# Patient Record
Sex: FEMALE | Race: WHITE | NOT HISPANIC OR LATINO | Employment: FULL TIME | URBAN - METROPOLITAN AREA
[De-identification: names, ages, dates, MRNs, and addresses within clinical notes are randomized per-mention and may not be internally consistent; named-entity substitution may affect disease eponyms.]

---

## 2023-09-09 PROBLEM — N92.6 IRREGULAR BLEEDING: Status: ACTIVE | Noted: 2023-09-09

## 2023-09-09 PROBLEM — E78.5 MILD HYPERLIPIDEMIA: Status: ACTIVE | Noted: 2023-09-09

## 2023-09-09 PROBLEM — R06.81 WITNESSED EPISODE OF APNEA: Status: ACTIVE | Noted: 2023-09-09

## 2023-09-09 PROBLEM — N95.1 PERIMENOPAUSE: Status: ACTIVE | Noted: 2023-09-09

## 2023-09-09 PROBLEM — G43.909 MIGRAINE: Status: ACTIVE | Noted: 2023-09-09

## 2023-09-09 PROBLEM — E55.9 VITAMIN D DEFICIENCY: Status: ACTIVE | Noted: 2023-09-09

## 2023-09-09 PROBLEM — N95.0 POSTMENOPAUSAL BLEEDING: Status: ACTIVE | Noted: 2023-09-09

## 2023-09-09 PROBLEM — E66.01 MORBID OBESITY (MULTI): Status: ACTIVE | Noted: 2023-09-09

## 2023-09-09 PROBLEM — R06.83 SNORING: Status: ACTIVE | Noted: 2023-09-09

## 2023-09-09 PROBLEM — R53.83 FATIGUE: Status: ACTIVE | Noted: 2023-09-09

## 2023-09-09 RX ORDER — OMEGA-3 FATTY ACIDS 1000 MG
1 CAPSULE ORAL DAILY
COMMUNITY

## 2023-09-09 RX ORDER — ERGOCALCIFEROL 1.25 MG/1
1.25 CAPSULE ORAL
COMMUNITY
Start: 2022-02-02 | End: 2024-03-21 | Stop reason: ALTCHOICE

## 2023-09-09 RX ORDER — CHOLECALCIFEROL (VITAMIN D3) 50 MCG
50 TABLET ORAL DAILY
COMMUNITY

## 2023-09-09 RX ORDER — OMEPRAZOLE 40 MG/1
40 CAPSULE, DELAYED RELEASE ORAL
COMMUNITY
Start: 2023-02-06 | End: 2024-03-21 | Stop reason: ALTCHOICE

## 2024-03-20 ASSESSMENT — ENCOUNTER SYMPTOMS
JOINT SWELLING: 0
UNEXPECTED WEIGHT CHANGE: 0
WEAKNESS: 0
DIFFICULTY URINATING: 0
DIZZINESS: 0
ADENOPATHY: 0
SHORTNESS OF BREATH: 0
DYSURIA: 0
HEADACHES: 0
ABDOMINAL DISTENTION: 0
FATIGUE: 0
ABDOMINAL PAIN: 0
ACTIVITY CHANGE: 0
COLOR CHANGE: 0
CHEST TIGHTNESS: 0

## 2024-03-21 ENCOUNTER — OFFICE VISIT (OUTPATIENT)
Dept: OBSTETRICS AND GYNECOLOGY | Facility: CLINIC | Age: 59
End: 2024-03-21
Payer: COMMERCIAL

## 2024-03-21 VITALS
HEIGHT: 67 IN | BODY MASS INDEX: 31.27 KG/M2 | SYSTOLIC BLOOD PRESSURE: 120 MMHG | DIASTOLIC BLOOD PRESSURE: 80 MMHG | WEIGHT: 199.2 LBS

## 2024-03-21 DIAGNOSIS — Z78.0 MENOPAUSE: ICD-10-CM

## 2024-03-21 DIAGNOSIS — Z12.31 ENCOUNTER FOR SCREENING MAMMOGRAM FOR MALIGNANT NEOPLASM OF BREAST: ICD-10-CM

## 2024-03-21 DIAGNOSIS — Z12.11 SCREEN FOR COLON CANCER: ICD-10-CM

## 2024-03-21 DIAGNOSIS — Z01.419 VISIT FOR GYNECOLOGIC EXAMINATION: Primary | ICD-10-CM

## 2024-03-21 PROCEDURE — 99396 PREV VISIT EST AGE 40-64: CPT | Performed by: OBSTETRICS & GYNECOLOGY

## 2024-03-21 PROCEDURE — 1036F TOBACCO NON-USER: CPT | Performed by: OBSTETRICS & GYNECOLOGY

## 2024-03-21 PROCEDURE — 3008F BODY MASS INDEX DOCD: CPT | Performed by: OBSTETRICS & GYNECOLOGY

## 2024-03-21 RX ORDER — IBUPROFEN 100 MG/5ML
1000 SUSPENSION, ORAL (FINAL DOSE FORM) ORAL DAILY
COMMUNITY

## 2024-03-21 ASSESSMENT — PAIN SCALES - GENERAL: PAINLEVEL: 0-NO PAIN

## 2024-03-21 ASSESSMENT — ENCOUNTER SYMPTOMS
OCCASIONAL FEELINGS OF UNSTEADINESS: 0
DEPRESSION: 0
LOSS OF SENSATION IN FEET: 0

## 2024-03-21 ASSESSMENT — LIFESTYLE VARIABLES
HOW MANY STANDARD DRINKS CONTAINING ALCOHOL DO YOU HAVE ON A TYPICAL DAY: 1 OR 2
HOW OFTEN DO YOU HAVE SIX OR MORE DRINKS ON ONE OCCASION: NEVER
SKIP TO QUESTIONS 9-10: 1
AUDIT-C TOTAL SCORE: 3
HOW OFTEN DO YOU HAVE A DRINK CONTAINING ALCOHOL: 2-3 TIMES A WEEK

## 2024-03-21 ASSESSMENT — PATIENT HEALTH QUESTIONNAIRE - PHQ9
1. LITTLE INTEREST OR PLEASURE IN DOING THINGS: NOT AT ALL
SUM OF ALL RESPONSES TO PHQ9 QUESTIONS 1 & 2: 0
2. FEELING DOWN, DEPRESSED OR HOPELESS: NOT AT ALL

## 2024-03-21 NOTE — PROGRESS NOTES
"Annual-menopause  Subjective   Mayuri La is a 58 y.o. female who is here for a routine exam.   Complaints: reports some wt regain over the winter; recent uri; due for wellness exam w pcp;     denies vag bleed or discharge; denies pelvic  pain, pressure, or persistent bloating.  PMHx:  Eye Exam-        Dental Exam-        PAP- .       Mamm-        Colonoscopy- No ; Cologuard completed, 2023; f/u due.       Bilat brst complex cysts; resolved on  mamm  Surgical History: None  Father: , MVA; Parkinson's  Mother: ,  Alzheimer's; ASTHMA  Occupation: INTERNET INVESTIGATOR.  Last pap  2022-neg,hpv-neg  Mamm  2023 dx studies neg/resume routine; 10/13/2022-stable, 2021= stable and decreasing complex cysts in each breast; advised follow-up 6 months  Pelvic Ultrasound 2020 11 mm endometrium with cystic component.   Menarche 11. Date of Last Period 2020.   STDs none. HPV NEGATIVE . gardasil no.   currently sexually active- no issue w pain/dryness; denies exposure concerns.   Other endometrial bx 2018 weakly proliferative tissue.   Total pregnancies  4. Total live births  4.    Review of Systems   Constitutional:  Negative for activity change, fatigue and unexpected weight change.   Respiratory:  Negative for chest tightness and shortness of breath.    Cardiovascular:  Negative for chest pain and leg swelling.   Gastrointestinal:  Negative for abdominal distention and abdominal pain.   Genitourinary:  Negative for difficulty urinating, dysuria, genital sores, pelvic pain, vaginal bleeding, vaginal discharge and vaginal pain.   Musculoskeletal:  Negative for gait problem and joint swelling.   Skin:  Negative for color change and rash.   Neurological:  Negative for dizziness, weakness and headaches.   Hematological:  Negative for adenopathy.   Objective Visit Vitals  /80   Ht 1.702 m (5' 7\")   Wt 90.4 kg (199 lb 3.2 oz)   BMI 31.20 kg/m²   OB Status " Postmenopausal   Smoking Status Never   BSA 2.07 m²       General:   Alert and oriented, in no acute distress   Neck: Supple. No visible thyromegaly.    Breast/Axilla: Normal to palpation bilaterally without masses, skin changes, or nipple discharge.    Abdomen: Soft, non-tender, without masses or organomegaly   Vulva: Normal architecture without erythema, masses, or lesions.    Vagina: Normal mucosa without lesions, masses.   No abnormal vaginal discharge.    Cervix: Normal without masses, lesions, or signs of cervicitis.    Uterus: Normal mobile, non-enlarged uterus    Adnexa: No palpable masses or tenderness   Pelvic Floor No POP noted. No high tone pelvic floor    Psych  Rectal Normal affect. Normal mood.      Assessment/Plan   Encounter Diagnoses   Name Primary?    Visit for gynecologic examination; grossly bnl breast/gyn exams. Yes    Menopause; rare sxs; sleeps well, maybe occasional night sweat.     Encounter for screening mammogram for malignant neoplasm of breast; order placed     Screen for colon cancer; testing utd/neg     BMI 31.0-31.9,adult; ongoing wt loss efforts encouraged for multiple health benefits.     Karen Grande MD

## 2024-09-09 ENCOUNTER — TELEPHONE (OUTPATIENT)
Dept: PRIMARY CARE | Facility: CLINIC | Age: 59
End: 2024-09-09
Payer: COMMERCIAL

## 2024-09-10 ENCOUNTER — TELEMEDICINE (OUTPATIENT)
Dept: PRIMARY CARE | Facility: CLINIC | Age: 59
End: 2024-09-10
Payer: COMMERCIAL

## 2024-09-10 DIAGNOSIS — R10.84 GENERALIZED ABDOMINAL PAIN: ICD-10-CM

## 2024-09-10 DIAGNOSIS — R11.2 NAUSEA AND VOMITING, UNSPECIFIED VOMITING TYPE: ICD-10-CM

## 2024-09-10 DIAGNOSIS — R10.13 EPIGASTRIC PAIN: Primary | ICD-10-CM

## 2024-09-10 PROCEDURE — 1036F TOBACCO NON-USER: CPT | Performed by: PHYSICIAN ASSISTANT

## 2024-09-10 PROCEDURE — 99214 OFFICE O/P EST MOD 30 MIN: CPT | Performed by: PHYSICIAN ASSISTANT

## 2024-09-10 ASSESSMENT — ENCOUNTER SYMPTOMS
ABDOMINAL PAIN: 1
ABDOMINAL DISTENTION: 0
ALLERGIC/IMMUNOLOGIC NEGATIVE: 1
HEMATOLOGIC/LYMPHATIC NEGATIVE: 1
DIARRHEA: 0
MUSCULOSKELETAL NEGATIVE: 1
NAUSEA: 1
DIZZINESS: 0
BACK PAIN: 0
PSYCHIATRIC NEGATIVE: 1
COUGH: 0
NEUROLOGICAL NEGATIVE: 1
RECTAL PAIN: 0
COLOR CHANGE: 0
CONSTIPATION: 0
ANAL BLEEDING: 0
RESPIRATORY NEGATIVE: 1
SHORTNESS OF BREATH: 0
NERVOUS/ANXIOUS: 0
ARTHRALGIAS: 0
PALPITATIONS: 0
ENDOCRINE NEGATIVE: 1
HEADACHES: 0
VOMITING: 1
WHEEZING: 0
CONSTITUTIONAL NEGATIVE: 1
BLOOD IN STOOL: 0
EYES NEGATIVE: 1

## 2024-09-10 NOTE — PROGRESS NOTES
Subjective   Patient ID: Mayuri La is a 58 y.o. female who presents for Abdominal Pain.  An interactive audio and video telecommunication system which permits real time communications between the patient (at the originating site) and provider (at the distant site) was utilized to provide this telehealth service.    Verbal consent was requested and obtained from Mayuri La  on this date, 09/10/24  , for a telehealth visit.        HPI     MAYURI LA, 58 year old female with no significant medical hx is here today for recurrent episodes  of nausea with vominting and abdominal pain  Last episode monday 4am - 7am  9/21  sever abd - 10-10 pain with n/v   Then all sxs resolved   Normal urine and normal bowels --normal appetite and activity -   Then again on Sunday night - same sxs -- 730 pm till 2 am     Tries to take pepto and gas x --no relief with any OTC medications   No episodes since   Does not seem the be related to certain foods or activities or alcohol   Weight steady-- no loss   Had gained some over the past few months        Episode 1  1/2 year ago (2 2023) pt states she woke at 4 am with severe abdominal pain - diffuse - started in epigastric region   + 1 episode vomit at 7 am - + nausea - slight   took mylanta, tums and OTC prilosec   pain this am 10 -10 - not pain is 2-10   ?? hx gastric ulcer in the past 10-15 years ago      PMhx significant medical hx obesity, snoring, headaches, covid 19 -- 12/2021 and july 2022      patient states feeling well otherwise  denies fever, chills, headache, dizziness, CP, SOB, palpitations, edema, numbness, tingling, weakness  A chaperone was offered to the patient for the physical exam /  exam and was declined        Review of Systems   Constitutional: Negative.    HENT: Negative.     Eyes: Negative.    Respiratory: Negative.  Negative for cough, shortness of breath and wheezing.    Cardiovascular:  Negative for chest pain and palpitations.   Gastrointestinal:   Positive for abdominal pain, nausea and vomiting. Negative for abdominal distention, anal bleeding, blood in stool, constipation, diarrhea and rectal pain.   Endocrine: Negative.    Genitourinary: Negative.    Musculoskeletal: Negative.  Negative for arthralgias and back pain.   Skin: Negative.  Negative for color change, pallor and rash.   Allergic/Immunologic: Negative.    Neurological: Negative.  Negative for dizziness and headaches.   Hematological: Negative.    Psychiatric/Behavioral: Negative.  The patient is not nervous/anxious.        Objective   There were no vitals taken for this visit.    Physical Exam  Constitutional:       Appearance: Normal appearance.   Neurological:      Mental Status: She is alert and oriented to person, place, and time.   Psychiatric:         Mood and Affect: Mood normal.         Behavior: Behavior normal.         Thought Content: Thought content normal.         Judgment: Judgment normal.         Assessment/Plan   Problem List Items Addressed This Visit    None  Visit Diagnoses       Epigastric pain    -  Primary    Relevant Orders    CBC    Comprehensive Metabolic Panel    Lipase    Amylase    CT abdomen pelvis w and wo IV contrast    Referral to Gastroenterology    Nausea and vomiting, unspecified vomiting type        Relevant Orders    CBC    Comprehensive Metabolic Panel    Lipase    Amylase    CT abdomen pelvis w and wo IV contrast    Referral to Gastroenterology    Generalized abdominal pain        Relevant Orders    CBC    Comprehensive Metabolic Panel    Lipase    Amylase    CT abdomen pelvis w and wo IV contrast    Referral to Gastroenterology          abd pain - nausea - vomit   - Stable, improved on its own -- 100% resolved   -ER if sxs reoccur or worsen   - increase fluids and bland diet as well     use as needed Omeprazole 40 MG   â€“Discussed medication dosage, usage, goals of therapy, and side effects  -  refer to GI   CT abd and pelvis ordered today, pt advised to  call office when results are available to discuss with provider    Labs ordered today, pt advised to call office when results are available to discuss with provider         This visit was completed via [email or web address]   All issues as below were discussed and addressed  . If it was felt that the patient should be evaluated in clinic then they were directed there. The patient verbally consented to visit. Spent a total of 20 minutes with patient on Tele-Health discussing health concerns & more than 50% of this time was spent in counseling & coordination of care.

## 2024-09-11 ENCOUNTER — LAB (OUTPATIENT)
Dept: LAB | Facility: LAB | Age: 59
End: 2024-09-11
Payer: COMMERCIAL

## 2024-09-11 ENCOUNTER — OFFICE VISIT (OUTPATIENT)
Dept: GASTROENTEROLOGY | Facility: CLINIC | Age: 59
End: 2024-09-11
Payer: COMMERCIAL

## 2024-09-11 VITALS — HEART RATE: 85 BPM | OXYGEN SATURATION: 98 % | HEIGHT: 67 IN | BODY MASS INDEX: 32.49 KG/M2 | WEIGHT: 207 LBS

## 2024-09-11 DIAGNOSIS — R10.84 GENERALIZED ABDOMINAL PAIN: ICD-10-CM

## 2024-09-11 DIAGNOSIS — R10.13 EPIGASTRIC PAIN: ICD-10-CM

## 2024-09-11 DIAGNOSIS — R11.2 NAUSEA AND VOMITING, UNSPECIFIED VOMITING TYPE: ICD-10-CM

## 2024-09-11 LAB
ALBUMIN SERPL BCP-MCNC: 4.4 G/DL (ref 3.4–5)
ALP SERPL-CCNC: 72 U/L (ref 33–110)
ALT SERPL W P-5'-P-CCNC: 16 U/L (ref 7–45)
AMYLASE SERPL-CCNC: 42 U/L (ref 29–103)
ANION GAP SERPL CALC-SCNC: 13 MMOL/L (ref 10–20)
AST SERPL W P-5'-P-CCNC: 13 U/L (ref 9–39)
BILIRUB SERPL-MCNC: 0.6 MG/DL (ref 0–1.2)
BUN SERPL-MCNC: 14 MG/DL (ref 6–23)
CALCIUM SERPL-MCNC: 9.9 MG/DL (ref 8.6–10.6)
CHLORIDE SERPL-SCNC: 102 MMOL/L (ref 98–107)
CO2 SERPL-SCNC: 29 MMOL/L (ref 21–32)
CREAT SERPL-MCNC: 0.8 MG/DL (ref 0.5–1.05)
EGFRCR SERPLBLD CKD-EPI 2021: 86 ML/MIN/1.73M*2
ERYTHROCYTE [DISTWIDTH] IN BLOOD BY AUTOMATED COUNT: 12.6 % (ref 11.5–14.5)
GLUCOSE SERPL-MCNC: 88 MG/DL (ref 74–99)
HCT VFR BLD AUTO: 43 % (ref 36–46)
HGB BLD-MCNC: 14.5 G/DL (ref 12–16)
LIPASE SERPL-CCNC: 39 U/L (ref 9–82)
MCH RBC QN AUTO: 31.1 PG (ref 26–34)
MCHC RBC AUTO-ENTMCNC: 33.7 G/DL (ref 32–36)
MCV RBC AUTO: 92 FL (ref 80–100)
NRBC BLD-RTO: 0 /100 WBCS (ref 0–0)
PLATELET # BLD AUTO: 273 X10*3/UL (ref 150–450)
POTASSIUM SERPL-SCNC: 4.4 MMOL/L (ref 3.5–5.3)
PROT SERPL-MCNC: 7.3 G/DL (ref 6.4–8.2)
RBC # BLD AUTO: 4.66 X10*6/UL (ref 4–5.2)
SODIUM SERPL-SCNC: 140 MMOL/L (ref 136–145)
WBC # BLD AUTO: 6.9 X10*3/UL (ref 4.4–11.3)

## 2024-09-11 PROCEDURE — 1036F TOBACCO NON-USER: CPT

## 2024-09-11 PROCEDURE — 82150 ASSAY OF AMYLASE: CPT

## 2024-09-11 PROCEDURE — 3008F BODY MASS INDEX DOCD: CPT

## 2024-09-11 PROCEDURE — 80053 COMPREHEN METABOLIC PANEL: CPT

## 2024-09-11 PROCEDURE — 85027 COMPLETE CBC AUTOMATED: CPT

## 2024-09-11 PROCEDURE — 83690 ASSAY OF LIPASE: CPT

## 2024-09-11 PROCEDURE — 99203 OFFICE O/P NEW LOW 30 MIN: CPT

## 2024-09-11 PROCEDURE — 36415 COLL VENOUS BLD VENIPUNCTURE: CPT

## 2024-09-11 RX ORDER — PANTOPRAZOLE SODIUM 40 MG/1
40 TABLET, DELAYED RELEASE ORAL DAILY
Qty: 30 TABLET | Refills: 11 | Status: SHIPPED | OUTPATIENT
Start: 2024-09-11 | End: 2025-09-11

## 2024-09-11 ASSESSMENT — ENCOUNTER SYMPTOMS
CHILLS: 0
RECTAL PAIN: 0
NAUSEA: 0
FEVER: 0
COUGH: 0
CONSTIPATION: 0
FATIGUE: 0
VOMITING: 1
APPETITE CHANGE: 0
BLOOD IN STOOL: 0
ABDOMINAL DISTENTION: 0
SHORTNESS OF BREATH: 0
ANAL BLEEDING: 0
TROUBLE SWALLOWING: 0
DIARRHEA: 0
ABDOMINAL PAIN: 1

## 2024-09-11 NOTE — ASSESSMENT & PLAN NOTE
Consider esophagitis, gastritis, duodenitis, PUD, possibly pancreatitis  -Antireflux regimen recommended  -Start 40 mg Protonix daily  -Patient would like to avoid EGD but is agreeable if absolutely necessary  -CT scan, CBC, CMP, amylase and lipase ordered per primary care    Follow-up in 2 months

## 2024-09-11 NOTE — PROGRESS NOTES
Subjective     History of Present Illness:   Mayuri La is a 58 y.o. female with PMHx of HLD and obesity who presents to GI clinic for further evaluation of epigastric pain, nausea and vomiting    Today, since early 2023 has had a few episodes of bad stomach pain, recently worsening over the past few weeks.  Last a few hours. Thinks she had an ulcer in the past, but never had EGD.  Pain is in epigastric region that is tight and feels hard. Vomits nonbloody emesis once during the episodes, but no nausea.  Denies NSAID use.  Eats once daily normally for the past 2 years/intermittent fasting.  Drinks a lot of coffee. Stress level has increased. Moving bowels daily with formed stools.  States she is terrified of having anesthesia for she has never gotten a colonoscopy.  Would like to avoid any procedures.  Denies constipation, diarrhea, dyspepsia, melena, hematochezia, dysphagia, unintentional weight loss  CT scan is ordered from other provider.    Social  ETOH, denies smoking or marijuana  Denies fxh GI cancer or IBD  Abdominal Surgeries: denies    9/2023 negative Cologuard  Last colonoscopy   Last EGD       Past Medical History  As per HPI.     Social History  she  reports that she has never smoked. She has never used smokeless tobacco. She reports current alcohol use. She reports that she does not use drugs.     Family History  her family history includes Asthma in her mother; Breast cancer in her maternal grandmother; No Known Problems in her father.     Review of Systems  Review of Systems   Constitutional:  Negative for appetite change, chills, fatigue and fever.   HENT:  Negative for trouble swallowing.    Respiratory:  Negative for cough and shortness of breath.    Gastrointestinal:  Positive for abdominal pain (Intermittent epigastric) and vomiting. Negative for abdominal distention, anal bleeding, blood in stool, constipation, diarrhea, nausea and rectal pain.       Allergies  Allergies   Allergen  Reactions    Erythromycin Unknown    Penicillins Hives    Sulfa (Sulfonamide Antibiotics) Hives       Medications  Current Outpatient Medications   Medication Instructions    ascorbic acid (VITAMIN C) 1,000 mg, oral, Daily    cholecalciferol (VITAMIN D-3) 50 mcg, oral, Daily    omega-3 (Super Omega-3) 1,000 mg capsule capsule 1 capsule, oral, Daily    pantoprazole (PROTONIX) 40 mg, oral, Daily, Do not crush, chew, or split.        Objective   Visit Vitals  Pulse 85      Physical Exam  Constitutional:       Appearance: Normal appearance. She is normal weight.   HENT:      Mouth/Throat:      Mouth: Mucous membranes are dry.      Pharynx: Oropharynx is clear.   Cardiovascular:      Rate and Rhythm: Normal rate and regular rhythm.   Pulmonary:      Effort: Pulmonary effort is normal.      Breath sounds: Normal breath sounds. No wheezing or rhonchi.   Abdominal:      General: Abdomen is flat. Bowel sounds are normal. There is no distension.      Palpations: Abdomen is soft. There is no hepatomegaly.      Tenderness: There is no abdominal tenderness. There is no guarding or rebound. Negative signs include Romano's sign.      Hernia: No hernia is present.   Musculoskeletal:         General: Normal range of motion.   Skin:     General: Skin is warm and dry.   Neurological:      General: No focal deficit present.      Mental Status: She is alert and oriented to person, place, and time.   Psychiatric:         Mood and Affect: Mood normal.         Behavior: Behavior normal.           Lab Results   Component Value Date    WBC 6.8 01/26/2022    HGB 15.0 01/26/2022    HCT 45.5 01/26/2022     01/26/2022     Lab Results   Component Value Date     06/08/2022     01/26/2022    K 4.3 06/08/2022    K 4.4 01/26/2022     06/08/2022     01/26/2022    CO2 27 06/08/2022    CO2 26 01/26/2022    BUN 13 06/08/2022    BUN 18 01/26/2022    CREATININE 0.82 06/08/2022    CREATININE 0.75 01/26/2022    CALCIUM 10.0  06/08/2022    CALCIUM 9.8 01/26/2022    PROT 7.6 06/08/2022    PROT 7.4 01/26/2022    BILITOT 0.7 06/08/2022    BILITOT 0.6 01/26/2022    ALKPHOS 87 06/08/2022    ALKPHOS 79 01/26/2022    ALT 21 06/08/2022    ALT 29 01/26/2022    AST 18 06/08/2022    AST 18 01/26/2022    GLUCOSE 109 (H) 06/08/2022    GLUCOSE 115 (H) 01/26/2022           Mayuri La is a 58 y.o. female who presents to GI clinic for epigastric pain.    Epigastric pain  Consider esophagitis, gastritis, duodenitis, PUD, possibly pancreatitis  -Antireflux regimen recommended  -Start 40 mg Protonix daily  -Patient would like to avoid EGD but is agreeable if absolutely necessary  -CT scan, CBC, CMP, amylase and lipase ordered per primary care    Follow-up in 2 months         Mary Gallardo, APRN-CNP

## 2024-09-11 NOTE — PATIENT INSTRUCTIONS
Try to minimize acidic foods such as citrus fruits, tomatoes, and pop. Also try to avoid chocolate, peppermint, alcohol, and caffeine.  Avoid eating within 2-3 hours of lying down.   Eat more frequent smaller meals instead of a few large meals.  You can prop the head of your bed up when you are sleeping to decrease reflux.    Please take Pantoprazole 30-60 minutes before a meal daily.  This is to help calm stomach acid.    Follow up in 2-3 months.  Send me a message on my chart when you get your CT scan so I can review it

## 2024-09-12 NOTE — RESULT ENCOUNTER NOTE
Please call and inform pt that all labs are stable - bland  diet  And call office with any further questions or concerns           When is CT scan

## 2024-09-16 DIAGNOSIS — R10.13 EPIGASTRIC PAIN: ICD-10-CM

## 2024-09-16 DIAGNOSIS — R11.2 NAUSEA AND VOMITING, UNSPECIFIED VOMITING TYPE: ICD-10-CM

## 2024-09-18 ENCOUNTER — APPOINTMENT (OUTPATIENT)
Dept: PRIMARY CARE | Facility: CLINIC | Age: 59
End: 2024-09-18
Payer: COMMERCIAL

## 2024-09-19 ENCOUNTER — HOSPITAL ENCOUNTER (OUTPATIENT)
Dept: RADIOLOGY | Facility: HOSPITAL | Age: 59
Discharge: HOME | End: 2024-09-19
Payer: COMMERCIAL

## 2024-09-19 ENCOUNTER — APPOINTMENT (OUTPATIENT)
Dept: RADIOLOGY | Facility: HOSPITAL | Age: 59
End: 2024-09-19
Payer: COMMERCIAL

## 2024-09-19 DIAGNOSIS — R10.13 EPIGASTRIC PAIN: ICD-10-CM

## 2024-09-19 DIAGNOSIS — R11.2 NAUSEA AND VOMITING, UNSPECIFIED VOMITING TYPE: ICD-10-CM

## 2024-09-19 PROCEDURE — 74176 CT ABD & PELVIS W/O CONTRAST: CPT

## 2024-09-24 DIAGNOSIS — R11.2 NAUSEA AND VOMITING, UNSPECIFIED VOMITING TYPE: Primary | ICD-10-CM

## 2024-09-24 DIAGNOSIS — R10.84 GENERALIZED ABDOMINAL PAIN: ICD-10-CM

## 2024-09-27 ENCOUNTER — HOSPITAL ENCOUNTER (OUTPATIENT)
Dept: RADIOLOGY | Facility: HOSPITAL | Age: 59
Discharge: HOME | End: 2024-09-27
Payer: COMMERCIAL

## 2024-09-27 DIAGNOSIS — R11.2 NAUSEA AND VOMITING, UNSPECIFIED VOMITING TYPE: ICD-10-CM

## 2024-09-27 DIAGNOSIS — R10.84 GENERALIZED ABDOMINAL PAIN: ICD-10-CM

## 2024-09-27 PROCEDURE — 76705 ECHO EXAM OF ABDOMEN: CPT

## 2024-09-27 PROCEDURE — 76705 ECHO EXAM OF ABDOMEN: CPT | Performed by: RADIOLOGY

## 2024-09-30 DIAGNOSIS — R93.2 ABNORMAL ULTRASOUND OF GALLBLADDER: Primary | ICD-10-CM

## 2024-10-02 ENCOUNTER — HOSPITAL ENCOUNTER (OUTPATIENT)
Dept: RADIOLOGY | Facility: CLINIC | Age: 59
Discharge: HOME | End: 2024-10-02
Payer: COMMERCIAL

## 2024-10-02 VITALS — BODY MASS INDEX: 32.49 KG/M2 | WEIGHT: 207 LBS | HEIGHT: 67 IN

## 2024-10-02 DIAGNOSIS — Z12.31 ENCOUNTER FOR SCREENING MAMMOGRAM FOR MALIGNANT NEOPLASM OF BREAST: ICD-10-CM

## 2024-10-02 PROCEDURE — 77067 SCR MAMMO BI INCL CAD: CPT | Performed by: STUDENT IN AN ORGANIZED HEALTH CARE EDUCATION/TRAINING PROGRAM

## 2024-10-02 PROCEDURE — 77067 SCR MAMMO BI INCL CAD: CPT

## 2024-10-02 PROCEDURE — 77063 BREAST TOMOSYNTHESIS BI: CPT | Performed by: STUDENT IN AN ORGANIZED HEALTH CARE EDUCATION/TRAINING PROGRAM

## 2024-10-04 PROBLEM — M79.673 PAIN OF FOOT: Status: ACTIVE | Noted: 2024-10-04

## 2024-10-04 PROBLEM — Z91.89 OTHER SPECIFIED PERSONAL RISK FACTORS, NOT ELSEWHERE CLASSIFIED: Status: ACTIVE | Noted: 2024-10-04

## 2024-10-04 PROBLEM — R10.9 ACUTE ABDOMINAL PAIN: Status: ACTIVE | Noted: 2024-10-04

## 2024-10-04 PROBLEM — R11.2 NAUSEA AND VOMITING: Status: ACTIVE | Noted: 2024-10-04

## 2024-10-04 PROBLEM — E66.9 OBESITY WITH BODY MASS INDEX 30 OR GREATER: Status: ACTIVE | Noted: 2024-03-21

## 2024-10-04 PROBLEM — Z86.16 HISTORY OF SEVERE ACUTE RESPIRATORY SYNDROME CORONAVIRUS 2 (SARS-COV-2) DISEASE: Status: ACTIVE | Noted: 2024-10-04

## 2024-10-04 PROBLEM — R73.03 PREDIABETES: Status: ACTIVE | Noted: 2024-10-04

## 2024-10-04 PROBLEM — E66.9 OBESITY: Status: ACTIVE | Noted: 2023-09-09

## 2024-10-04 PROBLEM — Z80.3 FAMILY HISTORY OF MALIGNANT NEOPLASM OF BREAST: Status: ACTIVE | Noted: 2024-10-04

## 2024-10-04 PROBLEM — Z78.9 OTHER SPECIFIED CONDITIONS INFLUENCING HEALTH STATUS: Status: ACTIVE | Noted: 2024-10-04

## 2024-10-04 PROBLEM — R92.8 OTHER ABNORMAL AND INCONCLUSIVE FINDINGS ON DIAGNOSTIC IMAGING OF BREAST: Status: ACTIVE | Noted: 2023-04-13

## 2024-10-18 ENCOUNTER — OFFICE VISIT (OUTPATIENT)
Dept: SURGERY | Facility: CLINIC | Age: 59
End: 2024-10-18
Payer: COMMERCIAL

## 2024-10-18 VITALS
OXYGEN SATURATION: 99 % | BODY MASS INDEX: 32.65 KG/M2 | DIASTOLIC BLOOD PRESSURE: 90 MMHG | WEIGHT: 208 LBS | HEIGHT: 67 IN | HEART RATE: 102 BPM | SYSTOLIC BLOOD PRESSURE: 128 MMHG | TEMPERATURE: 98.3 F

## 2024-10-18 DIAGNOSIS — R93.2 ABNORMAL ULTRASOUND OF GALLBLADDER: ICD-10-CM

## 2024-10-18 DIAGNOSIS — K81.1 CHRONIC CHOLECYSTITIS: Primary | ICD-10-CM

## 2024-10-18 PROCEDURE — 99204 OFFICE O/P NEW MOD 45 MIN: CPT | Performed by: STUDENT IN AN ORGANIZED HEALTH CARE EDUCATION/TRAINING PROGRAM

## 2024-10-18 PROCEDURE — 99214 OFFICE O/P EST MOD 30 MIN: CPT | Performed by: STUDENT IN AN ORGANIZED HEALTH CARE EDUCATION/TRAINING PROGRAM

## 2024-10-18 PROCEDURE — 3008F BODY MASS INDEX DOCD: CPT | Performed by: STUDENT IN AN ORGANIZED HEALTH CARE EDUCATION/TRAINING PROGRAM

## 2024-10-18 ASSESSMENT — PATIENT HEALTH QUESTIONNAIRE - PHQ9
2. FEELING DOWN, DEPRESSED OR HOPELESS: NOT AT ALL
SUM OF ALL RESPONSES TO PHQ9 QUESTIONS 1 AND 2: 0
1. LITTLE INTEREST OR PLEASURE IN DOING THINGS: NOT AT ALL

## 2024-10-18 ASSESSMENT — PAIN SCALES - GENERAL: PAINLEVEL_OUTOF10: 0-NO PAIN

## 2024-10-21 NOTE — PROGRESS NOTES
History Of Present Illness  Mayuri La is a 59 y.o. female presenting for evaluation of gall stones. She has had several attacks of RUQ pain with associated n/v. Her PCP ordered a CT and RUQ US which showed gall stones including 1 in the neck.  She reports the last 1 was several hours long but eventually resolved on its own.  No pain in between episodes.     Past Medical History  She has no past medical history on file.    Surgical History  She has a past surgical history that includes Other surgical history (01/26/2022).     Social History  She reports that she has never smoked. She has never used smokeless tobacco. She reports current alcohol use of about 4.0 standard drinks of alcohol per week. She reports that she does not use drugs.    Family History  Family History   Problem Relation Name Age of Onset    Asthma Mother Yajaira Warm     No Known Problems Father      Breast cancer Maternal Grandmother Crista Cano 60        Allergies  Erythromycin, Penicillins, and Sulfa (sulfonamide antibiotics)    Review of Systems   Constitutional:  Negative for chills, fever and unexpected weight change.   HENT:  Negative for sneezing, sore throat, trouble swallowing and voice change.    Respiratory:  Negative for chest tightness and shortness of breath.    Cardiovascular:  Negative for chest pain and palpitations.   Gastrointestinal:  Positive for abdominal pain, nausea and vomiting. Negative for blood in stool and diarrhea.   Endocrine: Negative for cold intolerance and heat intolerance.   Genitourinary:  Negative for decreased urine volume, dysuria and hematuria.   Musculoskeletal:  Negative for arthralgias and gait problem.   Skin:  Negative for rash and wound.   Neurological:  Negative for facial asymmetry, speech difficulty and headaches.   Hematological:  Negative for adenopathy. Does not bruise/bleed easily.   Psychiatric/Behavioral:  Negative for self-injury and suicidal ideas.         Physical Exam  Vitals and  "nursing note reviewed.   Constitutional:       Appearance: Normal appearance.   HENT:      Head: Normocephalic and atraumatic.      Mouth/Throat:      Mouth: Mucous membranes are moist.      Pharynx: Oropharynx is clear.   Eyes:      Extraocular Movements: Extraocular movements intact.      Pupils: Pupils are equal, round, and reactive to light.   Cardiovascular:      Rate and Rhythm: Normal rate and regular rhythm.      Pulses: Normal pulses.   Pulmonary:      Effort: Pulmonary effort is normal.      Breath sounds: Normal breath sounds.   Abdominal:      General: There is no distension.      Palpations: Abdomen is soft.      Tenderness: There is no abdominal tenderness.   Musculoskeletal:      Cervical back: Normal range of motion and neck supple.   Skin:     General: Skin is warm and dry.   Neurological:      General: No focal deficit present.      Mental Status: She is alert and oriented to person, place, and time.   Psychiatric:         Mood and Affect: Mood normal.         Behavior: Behavior normal.          Last Recorded Vitals  Blood pressure 128/90, pulse 102, temperature 36.8 °C (98.3 °F), temperature source Oral, height 1.702 m (5' 7\"), weight 94.3 kg (208 lb), SpO2 99%.    Relevant Results  RUQ US with numerous stones including 1 at the neck of the gall bladder     Assessment/Plan   Problem List Items Addressed This Visit    None  Visit Diagnoses         Codes    Chronic cholecystitis    -  Primary K81.1    Relevant Orders    Case Request Operating Room: Cholecystectomy Robot-Assisted (Completed)    Abnormal ultrasound of gallbladder     R93.2          59-year-old female with multiple episodes of right upper quadrant pain.  Imaging has shown gallstones with a stone in the gallbladder neck.  We discussed the etiology of biliary pain and I think the stone in her gallbladder neck is the cause of her intermittent attacks.  I have recommended cholecystectomy.  I described the procedure in detail including " postoperative expectations in terms of pain control and wound care.  We discussed the risks, benefits and alternatives.  I described the possible side effect of diarrhea once the gallbladder is removed.  The patient understands and agrees that she needs her gallbladder out as this is likely the cause of her pain and she will continue to get intermittent attacks until she has surgery.  She does however have a lot of anxiety and fear about the anesthesia.  We discussed what to expect and the potential risks.  Her main concern is getting a panic attack while asleep since that has happened before and not being able to wake up.  Her brother is an anesthesiologist and she has talked to him about this as well.  She is not ready to schedule surgery today, and will call back when she is ready.  Encouraged her to call back sooner or come to the ER if she has an attack with pain that does not resolve, as this may be acute cholecystitis and she may require more urgent surgical intervention.      Aaliyah Fajardo MD

## 2024-10-23 ASSESSMENT — ENCOUNTER SYMPTOMS
CHEST TIGHTNESS: 0
WOUND: 0
DIARRHEA: 0
FEVER: 0
NAUSEA: 1
BLOOD IN STOOL: 0
CHILLS: 0
HEMATURIA: 0
VOICE CHANGE: 0
SPEECH DIFFICULTY: 0
UNEXPECTED WEIGHT CHANGE: 0
ADENOPATHY: 0
ABDOMINAL PAIN: 1
PALPITATIONS: 0
FACIAL ASYMMETRY: 0
BRUISES/BLEEDS EASILY: 0
SHORTNESS OF BREATH: 0
DYSURIA: 0
SORE THROAT: 0
HEADACHES: 0
VOMITING: 1
TROUBLE SWALLOWING: 0
ARTHRALGIAS: 0

## 2024-10-24 ENCOUNTER — TELEPHONE (OUTPATIENT)
Dept: PRIMARY CARE | Facility: CLINIC | Age: 59
End: 2024-10-24
Payer: COMMERCIAL

## 2024-10-24 ENCOUNTER — APPOINTMENT (OUTPATIENT)
Dept: RADIOLOGY | Facility: HOSPITAL | Age: 59
End: 2024-10-24
Payer: COMMERCIAL

## 2024-10-24 ENCOUNTER — HOSPITAL ENCOUNTER (EMERGENCY)
Facility: HOSPITAL | Age: 59
Discharge: HOME | End: 2024-10-24
Attending: EMERGENCY MEDICINE
Payer: COMMERCIAL

## 2024-10-24 VITALS
BODY MASS INDEX: 32.65 KG/M2 | SYSTOLIC BLOOD PRESSURE: 103 MMHG | HEART RATE: 98 BPM | WEIGHT: 208 LBS | HEIGHT: 67 IN | TEMPERATURE: 97.3 F | DIASTOLIC BLOOD PRESSURE: 61 MMHG | RESPIRATION RATE: 18 BRPM | OXYGEN SATURATION: 98 %

## 2024-10-24 DIAGNOSIS — R53.81 MALAISE AND FATIGUE: ICD-10-CM

## 2024-10-24 DIAGNOSIS — R53.83 MALAISE AND FATIGUE: ICD-10-CM

## 2024-10-24 DIAGNOSIS — R55 NEAR SYNCOPE: Primary | ICD-10-CM

## 2024-10-24 DIAGNOSIS — L50.9 URTICARIA: ICD-10-CM

## 2024-10-24 LAB
ALBUMIN SERPL BCP-MCNC: 3.8 G/DL (ref 3.4–5)
ALP SERPL-CCNC: 74 U/L (ref 33–110)
ALT SERPL W P-5'-P-CCNC: 17 U/L (ref 7–45)
ANION GAP SERPL CALCULATED.3IONS-SCNC: 12 MMOL/L (ref 10–20)
APPEARANCE UR: CLEAR
AST SERPL W P-5'-P-CCNC: 17 U/L (ref 9–39)
BACTERIA #/AREA URNS AUTO: ABNORMAL /HPF
BASOPHILS # BLD AUTO: 0.01 X10*3/UL (ref 0–0.1)
BASOPHILS NFR BLD AUTO: 0.1 %
BILIRUB SERPL-MCNC: 0.4 MG/DL (ref 0–1.2)
BILIRUB UR STRIP.AUTO-MCNC: NEGATIVE MG/DL
BNP SERPL-MCNC: 15 PG/ML (ref 0–99)
BUN SERPL-MCNC: 23 MG/DL (ref 6–23)
CALCIUM SERPL-MCNC: 9.2 MG/DL (ref 8.6–10.3)
CARDIAC TROPONIN I PNL SERPL HS: 3 NG/L (ref 0–13)
CARDIAC TROPONIN I PNL SERPL HS: 3 NG/L (ref 0–13)
CHLORIDE SERPL-SCNC: 106 MMOL/L (ref 98–107)
CO2 SERPL-SCNC: 25 MMOL/L (ref 21–32)
COLOR UR: YELLOW
CREAT SERPL-MCNC: 0.8 MG/DL (ref 0.5–1.05)
EGFRCR SERPLBLD CKD-EPI 2021: 85 ML/MIN/1.73M*2
EOSINOPHIL # BLD AUTO: 0.12 X10*3/UL (ref 0–0.7)
EOSINOPHIL NFR BLD AUTO: 1.2 %
ERYTHROCYTE [DISTWIDTH] IN BLOOD BY AUTOMATED COUNT: 12.8 % (ref 11.5–14.5)
FLUAV RNA RESP QL NAA+PROBE: NOT DETECTED
FLUBV RNA RESP QL NAA+PROBE: NOT DETECTED
GLUCOSE SERPL-MCNC: 178 MG/DL (ref 74–99)
GLUCOSE UR STRIP.AUTO-MCNC: ABNORMAL MG/DL
HCG UR QL IA.RAPID: NEGATIVE
HCT VFR BLD AUTO: 50.5 % (ref 36–46)
HGB BLD-MCNC: 17.3 G/DL (ref 12–16)
HOLD SPECIMEN: NORMAL
HYALINE CASTS #/AREA URNS AUTO: ABNORMAL /LPF
IMM GRANULOCYTES # BLD AUTO: 0.04 X10*3/UL (ref 0–0.7)
IMM GRANULOCYTES NFR BLD AUTO: 0.4 % (ref 0–0.9)
KETONES UR STRIP.AUTO-MCNC: NEGATIVE MG/DL
LEUKOCYTE ESTERASE UR QL STRIP.AUTO: NEGATIVE
LYMPHOCYTES # BLD AUTO: 2.84 X10*3/UL (ref 1.2–4.8)
LYMPHOCYTES NFR BLD AUTO: 27.5 %
MCH RBC QN AUTO: 31.7 PG (ref 26–34)
MCHC RBC AUTO-ENTMCNC: 34.3 G/DL (ref 32–36)
MCV RBC AUTO: 93 FL (ref 80–100)
MONOCYTES # BLD AUTO: 0.33 X10*3/UL (ref 0.1–1)
MONOCYTES NFR BLD AUTO: 3.2 %
MUCOUS THREADS #/AREA URNS AUTO: ABNORMAL /LPF
NEUTROPHILS # BLD AUTO: 6.97 X10*3/UL (ref 1.2–7.7)
NEUTROPHILS NFR BLD AUTO: 67.6 %
NITRITE UR QL STRIP.AUTO: NEGATIVE
NRBC BLD-RTO: 0 /100 WBCS (ref 0–0)
PH UR STRIP.AUTO: 5.5 [PH]
PLATELET # BLD AUTO: 235 X10*3/UL (ref 150–450)
POTASSIUM SERPL-SCNC: 4 MMOL/L (ref 3.5–5.3)
PROT SERPL-MCNC: 6.4 G/DL (ref 6.4–8.2)
PROT UR STRIP.AUTO-MCNC: ABNORMAL MG/DL
RBC # BLD AUTO: 5.45 X10*6/UL (ref 4–5.2)
RBC # UR STRIP.AUTO: NEGATIVE /UL
RBC #/AREA URNS AUTO: ABNORMAL /HPF
SARS-COV-2 RNA RESP QL NAA+PROBE: NOT DETECTED
SODIUM SERPL-SCNC: 139 MMOL/L (ref 136–145)
SP GR UR STRIP.AUTO: 1.03
SQUAMOUS #/AREA URNS AUTO: ABNORMAL /HPF
UROBILINOGEN UR STRIP.AUTO-MCNC: NORMAL MG/DL
WBC # BLD AUTO: 10.3 X10*3/UL (ref 4.4–11.3)
WBC #/AREA URNS AUTO: ABNORMAL /HPF

## 2024-10-24 PROCEDURE — 81025 URINE PREGNANCY TEST: CPT | Performed by: EMERGENCY MEDICINE

## 2024-10-24 PROCEDURE — 84484 ASSAY OF TROPONIN QUANT: CPT | Performed by: EMERGENCY MEDICINE

## 2024-10-24 PROCEDURE — 36415 COLL VENOUS BLD VENIPUNCTURE: CPT | Performed by: EMERGENCY MEDICINE

## 2024-10-24 PROCEDURE — 80053 COMPREHEN METABOLIC PANEL: CPT | Performed by: EMERGENCY MEDICINE

## 2024-10-24 PROCEDURE — 81001 URINALYSIS AUTO W/SCOPE: CPT | Performed by: EMERGENCY MEDICINE

## 2024-10-24 PROCEDURE — 96375 TX/PRO/DX INJ NEW DRUG ADDON: CPT

## 2024-10-24 PROCEDURE — 99284 EMERGENCY DEPT VISIT MOD MDM: CPT | Mod: 25

## 2024-10-24 PROCEDURE — 96374 THER/PROPH/DIAG INJ IV PUSH: CPT

## 2024-10-24 PROCEDURE — 71045 X-RAY EXAM CHEST 1 VIEW: CPT | Performed by: RADIOLOGY

## 2024-10-24 PROCEDURE — 87636 SARSCOV2 & INF A&B AMP PRB: CPT | Performed by: EMERGENCY MEDICINE

## 2024-10-24 PROCEDURE — 85025 COMPLETE CBC W/AUTO DIFF WBC: CPT | Performed by: EMERGENCY MEDICINE

## 2024-10-24 PROCEDURE — 83880 ASSAY OF NATRIURETIC PEPTIDE: CPT | Performed by: EMERGENCY MEDICINE

## 2024-10-24 PROCEDURE — 2500000004 HC RX 250 GENERAL PHARMACY W/ HCPCS (ALT 636 FOR OP/ED): Performed by: EMERGENCY MEDICINE

## 2024-10-24 PROCEDURE — 96361 HYDRATE IV INFUSION ADD-ON: CPT

## 2024-10-24 PROCEDURE — 71045 X-RAY EXAM CHEST 1 VIEW: CPT

## 2024-10-24 RX ORDER — METHYLPREDNISOLONE 4 MG/1
TABLET ORAL
Qty: 21 TABLET | Refills: 0 | Status: SHIPPED | OUTPATIENT
Start: 2024-10-24 | End: 2024-10-31

## 2024-10-24 RX ORDER — DIPHENHYDRAMINE HYDROCHLORIDE 50 MG/ML
50 INJECTION INTRAMUSCULAR; INTRAVENOUS ONCE
Status: COMPLETED | OUTPATIENT
Start: 2024-10-24 | End: 2024-10-24

## 2024-10-24 RX ORDER — DIPHENHYDRAMINE HCL 25 MG
25 TABLET ORAL EVERY 6 HOURS
Qty: 20 TABLET | Refills: 0 | Status: SHIPPED | OUTPATIENT
Start: 2024-10-24 | End: 2024-10-29

## 2024-10-24 RX ORDER — FAMOTIDINE 10 MG/ML
20 INJECTION INTRAVENOUS ONCE
Status: COMPLETED | OUTPATIENT
Start: 2024-10-24 | End: 2024-10-24

## 2024-10-24 ASSESSMENT — COLUMBIA-SUICIDE SEVERITY RATING SCALE - C-SSRS
2. HAVE YOU ACTUALLY HAD ANY THOUGHTS OF KILLING YOURSELF?: NO
1. IN THE PAST MONTH, HAVE YOU WISHED YOU WERE DEAD OR WISHED YOU COULD GO TO SLEEP AND NOT WAKE UP?: NO
6. HAVE YOU EVER DONE ANYTHING, STARTED TO DO ANYTHING, OR PREPARED TO DO ANYTHING TO END YOUR LIFE?: NO

## 2024-10-24 ASSESSMENT — PAIN - FUNCTIONAL ASSESSMENT: PAIN_FUNCTIONAL_ASSESSMENT: 0-10

## 2024-10-24 ASSESSMENT — PAIN SCALES - GENERAL
PAINLEVEL_OUTOF10: 0 - NO PAIN

## 2024-10-24 NOTE — Clinical Note
Mayuri La was seen and treated in our emergency department on 10/24/2024.  She may return to work on 10/26/2024.       If you have any questions or concerns, please don't hesitate to call.      Maya Phillip MD

## 2024-10-24 NOTE — DISCHARGE INSTRUCTIONS
Follow-up with your primary care physician within 1 to 2 days for further management of your current symptoms.    Do not drive or operate machinery until you are cleared to do so by your primary care physician    Return to the emergency department sooner with worsening of symptoms or onset of new symptoms

## 2024-10-24 NOTE — ED TRIAGE NOTES
Around 3 am developed a rash all over body, got up to get medication felt really dizzy and was lowered to the ground. Denies hitting head or Sob,

## 2024-10-24 NOTE — ED PROVIDER NOTES
HPI   Chief Complaint   Patient presents with    Dizziness       HPI        Patient History   No past medical history on file.  Past Surgical History:   Procedure Laterality Date    OTHER SURGICAL HISTORY  01/26/2022    [facility] tooth extraction     Family History   Problem Relation Name Age of Onset    Asthma Mother Yajaira Albright     No Known Problems Father      Breast cancer Maternal Grandmother Crista Cano 60     Social History     Tobacco Use    Smoking status: Never    Smokeless tobacco: Never   Vaping Use    Vaping status: Never Used   Substance Use Topics    Alcohol use: Yes     Alcohol/week: 4.0 standard drinks of alcohol     Types: 4 Glasses of wine per week    Drug use: Never       Physical Exam   ED Triage Vitals   Temperature Heart Rate Respirations BP   10/24/24 0544 10/24/24 0540 10/24/24 0540 10/24/24 0540   36.3 °C (97.3 °F) 88 19 93/73      Pulse Ox Temp src Heart Rate Source Patient Position   10/24/24 0540 -- -- --   99 %         BP Location FiO2 (%)     -- --             Physical Exam      ED Course & MDM   Diagnoses as of 10/24/24 1809   Near syncope   Urticaria   Malaise and fatigue                 No data recorded     [place] Coma Scale Score: 15 (10/24/24 0544 : Dakota Kathleen RN)                           Medical Decision Making    The patient is a 59-year-old female presenting to the emergency department for evaluation of generalized malaise, fatigue, itching, and near syncope.  The patient states that she started having some itching late last night early this morning.  She did wake up and take some Benadryl for it.  She states that when she woke up a little bit later and tried to walk she felt like she was going to pass out.  She is still itching and has hives.  She denies any headache or visual changes.  She denies any chest pain or shortness of breath.  She states that she just felt sweaty prior to coming to the emergency room.  No cough or congestion.  No fever or chills.  No  abdominal pain.  No nausea or vomiting.  No diarrhea or constipation.  No urinary complaints.  No vaginal discharge.  No known sick contacts or recent travel.  No new products, detergents and/or exposures.  All pertinent positives and negatives are recorded above.  All other systems reviewed and otherwise negative.  Vital signs within normal limits.  Physical exam with a well-nourished well-developed female in no acute distress.  HEENT exam within normal limits.  She does not have any evidence of airway compromise or respiratory distress.  Abdominal exam is benign.  She does not have any gross motor, neurologic or vascular deficits on exam.  Pulses are equal bilaterally.  NIH stroke scale score of 0.  She does have some scattered hives on her torso and arms.      EKG with normal sinus rhythm at 93 bpm, normal axis, normal voltage, normal ST segment, normal T waves      IV fluids, IV Benadryl, IV Pepcid, IV Solu-Medrol ordered with improvement in her symptoms.      Diagnostic labs without significant abnormality      Initial troponin 3.  Repeat  troponin 3      XR chest 1 view   Final Result   Allowing for the aforementioned limitation, unremarkable chest.        Signed by: Sammy Castillo 10/24/2024 8:16 AM   Dictation workstation:   [id number]           The patient does not have any evidence of airway compromise or respiratory distress on exam but she does not have any evidence of hemodynamic instability.  She is well-perfused on exam.  She does not have any evidence of acute process on the chest x-ray.  No evidence of pneumonia or pneumothorax.  No evidence of CHF.  No widening of the mediastinum.  The pulses were equal bilaterally.  She was able to ambulate without assistance in the emergency department.  EKG and cardiac enzymes without evidence of ischemia or arrhythmia.  Diagnostic labs without significant abnormality.  Her symptoms did improve with medications given in the emergency room.        The patient  was released in good condition with a prescription for Benadryl and a Medrol Dosepak.  She will follow-up with her primary care physician within 1 to 2 days for further management of her current symptoms and repeat check of her blood pressure.  She will return to the emergency department sooner with worsening of symptoms or onset of new symptoms.  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      Impression/diagnosis  Malaise and fatigue  Urticaria  Near syncope      I independently interpreted the results of the EKG and diagnostic labs      I reviewed the results of the diagnostic labs and diagnostic imaging.  Formal radiology reading was completed by the radiologist    Procedure  Procedures     Maya Phillip MD  10/24/24 3098       Maya Phillip MD  10/24/24 8461

## 2024-10-25 ENCOUNTER — TELEPHONE (OUTPATIENT)
Dept: PRIMARY CARE | Facility: CLINIC | Age: 59
End: 2024-10-25
Payer: COMMERCIAL

## 2024-10-25 ENCOUNTER — PATIENT MESSAGE (OUTPATIENT)
Dept: PRIMARY CARE | Facility: CLINIC | Age: 59
End: 2024-10-25
Payer: COMMERCIAL

## 2024-10-30 ENCOUNTER — APPOINTMENT (OUTPATIENT)
Dept: PRIMARY CARE | Facility: CLINIC | Age: 59
End: 2024-10-30
Payer: COMMERCIAL

## 2024-10-30 DIAGNOSIS — L50.9 HIVES: Primary | ICD-10-CM

## 2024-10-30 DIAGNOSIS — R55 NEAR SYNCOPE: ICD-10-CM

## 2024-10-30 PROCEDURE — 1036F TOBACCO NON-USER: CPT | Performed by: PHYSICIAN ASSISTANT

## 2024-10-30 PROCEDURE — 99213 OFFICE O/P EST LOW 20 MIN: CPT | Performed by: PHYSICIAN ASSISTANT

## 2024-10-30 ASSESSMENT — ENCOUNTER SYMPTOMS
SHORTNESS OF BREATH: 0
PALPITATIONS: 0
COUGH: 0
ARTHRALGIAS: 0
NERVOUS/ANXIOUS: 0
BACK PAIN: 0
HEADACHES: 0
TROUBLE SWALLOWING: 0
CONSTITUTIONAL NEGATIVE: 1
WHEEZING: 0
ENDOCRINE NEGATIVE: 1
SORE THROAT: 0
NEUROLOGICAL NEGATIVE: 1
NAUSEA: 0
CHILLS: 0
DIZZINESS: 0
PSYCHIATRIC NEGATIVE: 1
EYES NEGATIVE: 1
ALLERGIC/IMMUNOLOGIC NEGATIVE: 1
MUSCULOSKELETAL NEGATIVE: 1
VOMITING: 0
ABDOMINAL PAIN: 0
HEMATOLOGIC/LYMPHATIC NEGATIVE: 1
RESPIRATORY NEGATIVE: 1
FATIGUE: 0

## 2024-11-11 ENCOUNTER — TELEPHONE (OUTPATIENT)
Dept: SURGERY | Facility: CLINIC | Age: 59
End: 2024-11-11
Payer: COMMERCIAL

## 2024-11-11 DIAGNOSIS — K81.1 CHRONIC CHOLECYSTITIS: Primary | ICD-10-CM

## 2024-11-11 NOTE — TELEPHONE ENCOUNTER
Spoke to pt verified by name/.  Pt was last seen in the office on 10/28/24 for chronic Cholecystitis.   Pt is calling to schedule lap thelma with Dr. Fajardo on 24.  Surgery itinerary reviewed with pt, sent to pt's MyChart, and   Post-op appt scheduled for 24. Pls schedule pt for lap thelma  At [facility] on 24.

## 2024-12-05 ENCOUNTER — PRE-ADMISSION TESTING (OUTPATIENT)
Dept: PREADMISSION TESTING | Facility: HOSPITAL | Age: 59
End: 2024-12-05
Payer: COMMERCIAL

## 2024-12-05 VITALS
DIASTOLIC BLOOD PRESSURE: 69 MMHG | HEART RATE: 114 BPM | OXYGEN SATURATION: 98 % | SYSTOLIC BLOOD PRESSURE: 152 MMHG | HEIGHT: 67 IN | BODY MASS INDEX: 33.37 KG/M2 | TEMPERATURE: 98.6 F | WEIGHT: 212.6 LBS

## 2024-12-05 PROCEDURE — 99203 OFFICE O/P NEW LOW 30 MIN: CPT

## 2024-12-05 RX ORDER — IBUPROFEN 200 MG
800 TABLET ORAL EVERY 8 HOURS PRN
COMMUNITY

## 2024-12-05 RX ORDER — CETIRIZINE HYDROCHLORIDE 10 MG/1
10 TABLET, CHEWABLE ORAL DAILY PRN
COMMUNITY

## 2024-12-05 RX ORDER — OMEPRAZOLE 20 MG/1
20 CAPSULE, DELAYED RELEASE ORAL DAILY PRN
COMMUNITY

## 2024-12-05 ASSESSMENT — PAIN - FUNCTIONAL ASSESSMENT: PAIN_FUNCTIONAL_ASSESSMENT: 0-10

## 2024-12-05 ASSESSMENT — DUKE ACTIVITY SCORE INDEX (DASI)
CAN YOU PARTICIPATE IN STRENOUS SPORTS LIKE SWIMMING, SINGLES TENNIS, FOOTBALL, BASKETBALL, OR SKIING: NO
CAN YOU WALK INDOORS, SUCH AS AROUND YOUR HOUSE: YES
CAN YOU DO MODERATE WORK AROUND THE HOUSE LIKE VACUUMING, SWEEPING FLOORS OR CARRYING GROCERIES: YES
CAN YOU DO LIGHT WORK AROUND THE HOUSE LIKE DUSTING OR WASHING DISHES: YES
CAN YOU DO YARD WORK LIKE RAKING LEAVES, WEEDING OR PUSHING A MOWER: YES
CAN YOU WALK INDOORS, SUCH AS AROUND YOUR HOUSE: YES
CAN YOU PARTICIPATE IN STRENOUS SPORTS LIKE SWIMMING, SINGLES TENNIS, FOOTBALL, BASKETBALL, OR SKIING: NO
CAN YOU DO HEAVY WORK AROUND THE HOUSE LIKE SCRUBBING FLOORS OR LIFTING AND MOVING HEAVY FURNITURE: YES
CAN YOU PARTICIPATE IN MODERATE RECREATIONAL ACTIVITIES LIKE GOLF, BOWLING, DANCING, DOUBLES TENNIS OR THROWING A BASEBALL OR FOOTBALL: YES
CAN YOU DO LIGHT WORK AROUND THE HOUSE LIKE DUSTING OR WASHING DISHES: YES
CAN YOU TAKE CARE OF YOURSELF (EAT, DRESS, BATHE, OR USE TOILET): YES
CAN YOU RUN A SHORT DISTANCE: YES
CAN YOU HAVE SEXUAL RELATIONS: YES
CAN YOU DO YARD WORK LIKE RAKING LEAVES, WEEDING OR PUSHING A MOWER: YES
CAN YOU TAKE CARE OF YOURSELF (EAT, DRESS, BATHE, OR USE TOILET): YES
CAN YOU WALK A BLOCK OR TWO ON LEVEL GROUND: YES
CAN YOU DO MODERATE WORK AROUND THE HOUSE LIKE VACUUMING, SWEEPING FLOORS OR CARRYING GROCERIES: YES
CAN YOU PARTICIPATE IN MODERATE RECREATIONAL ACTIVITIES LIKE GOLF, BOWLING, DANCING, DOUBLES TENNIS OR THROWING A BASEBALL OR FOOTBALL: YES
CAN YOU WALK A BLOCK OR TWO ON LEVEL GROUND: YES
CAN YOU CLIMB A FLIGHT OF STAIRS OR WALK UP A HILL: YES
CAN YOU RUN A SHORT DISTANCE: YES
CAN YOU CLIMB A FLIGHT OF STAIRS OR WALK UP A HILL: YES

## 2024-12-05 ASSESSMENT — ENCOUNTER SYMPTOMS
MUSCULOSKELETAL NEGATIVE: 1
ALLERGIC/IMMUNOLOGIC NEGATIVE: 1
CARDIOVASCULAR NEGATIVE: 1
RESPIRATORY NEGATIVE: 1
PSYCHIATRIC NEGATIVE: 1
ENDOCRINE NEGATIVE: 1
EYES NEGATIVE: 1
CONSTITUTIONAL NEGATIVE: 1
ABDOMINAL PAIN: 1
NEUROLOGICAL NEGATIVE: 1
HEMATOLOGIC/LYMPHATIC NEGATIVE: 1

## 2024-12-05 ASSESSMENT — PAIN SCALES - GENERAL: PAINLEVEL_OUTOF10: 0 - NO PAIN

## 2024-12-05 NOTE — H&P (VIEW-ONLY)
CPM/PAT Evaluation       Name: Mayuri La (Mayuri La)  /Age: 1965/59 y.o.     In-Person       Chief Complaint: abdominal pain    HPI: Mayuri La is a 59 year old female that has complaints of abdominal pain with nausea, chills and vomiting. She states she has bouts of abdominal pain on and off since 2023. She states this year she had three bad episodes that lasted hours before any relief. She was sent for for an US of the gallbladder and it showed:  IMPRESSION:  1. The gallbladder is filled with sludge and numerous punctate  echogenicities probably tiny calculi. There is also a larger 1.7 cm  calculus in the neck of the gallbladder. Gallbladder wall thickening  noted. No pericholecystic fluid. Recommend surgical consultation.  2. Common bile duct minimally dilated at 8 mm. No intrahepatic  biliary distention.    She denies any RUQ discomfort when you press down. She denies constipation or diarrhea. She is scheduled for a cholecystectomy. She denies fever, chills, chest pain, sob, dizziness, and palpitations.    No past medical history on file.    Past Surgical History:   Procedure Laterality Date    OTHER SURGICAL HISTORY  2022    [place] tooth extraction       Patient  reports being sexually active and has had partner(s) who are male. She reports using the following method of birth control/protection: Post-menopausal.  Social History     Tobacco Use    Smoking status: Never    Smokeless tobacco: Never   Substance Use Topics    Alcohol use: Yes     Alcohol/week: 4.0 standard drinks of alcohol     Types: 4 Glasses of wine per week     Social History     Substance and Sexual Activity   Drug Use Never       Family History   Problem Relation Name Age of Onset    Asthma Mother Yajaira Warm     No Known Problems Father      Breast cancer Maternal Grandmother Crista Cano 60       Allergies   Allergen Reactions    Erythromycin Unknown    Penicillins Hives    Sulfa (Sulfonamide  Antibiotics) Hives     Current Outpatient Medications   Medication Sig Dispense Refill    ascorbic acid (Vitamin C) 1,000 mg tablet Take 1 tablet (1,000 mg) by mouth once daily.      cetirizine (ZyrTEC) 10 mg chewable tablet Chew 1 tablet (10 mg) once daily as needed for allergies or rhinitis.      cholecalciferol (Vitamin D-3) 50 MCG (2000 UT) tablet Take 1 tablet (50 mcg) by mouth once daily.      diphenhydrAMINE (Sominex) 25 mg tablet Take 1 tablet (25 mg) by mouth every 6 hours for 5 days. 20 tablet 0    ibuprofen 200 mg tablet Take 4 tablets (800 mg) by mouth every 8 hours if needed for mild pain (1 - 3) or moderate pain (4 - 6).      omega-3 (Super Omega-3) 1,000 mg capsule capsule Take 1 capsule (1,000 mg) by mouth once daily.      omeprazole (PriLOSEC) 20 mg DR capsule Take 1 capsule (20 mg) by mouth once daily as needed (GERD). Do not crush or chew.       No current facility-administered medications for this visit.       Review of Systems   Constitutional: Negative.    HENT: Negative.     Eyes: Negative.    Respiratory: Negative.     Cardiovascular: Negative.    Gastrointestinal:  Positive for abdominal pain.   Endocrine: Negative.    Genitourinary: Negative.    Musculoskeletal: Negative.    Skin: Negative.    Allergic/Immunologic: Negative.    Neurological: Negative.    Hematological: Negative.    Psychiatric/Behavioral: Negative.                Physical Exam  Vitals reviewed.   Constitutional:       Appearance: Normal appearance.   HENT:      Head: Normocephalic and atraumatic.      Nose: Nose normal.      Mouth/Throat:      Mouth: Mucous membranes are moist.      Pharynx: Oropharynx is clear.   Eyes:      Extraocular Movements: Extraocular movements intact.      Conjunctiva/sclera: Conjunctivae normal.      Pupils: Pupils are equal, round, and reactive to light.   Cardiovascular:      Rate and Rhythm: Normal rate and regular rhythm.      Pulses: Normal pulses.      Heart sounds: Normal heart sounds.  "  Pulmonary:      Effort: Pulmonary effort is normal.      Breath sounds: Normal breath sounds.   Abdominal:      General: Bowel sounds are normal.      Palpations: Abdomen is soft.   Genitourinary:     Comments: Assessment deferred to physician  Musculoskeletal:         General: Normal range of motion.      Cervical back: Normal range of motion and neck supple.   Skin:     General: Skin is warm and dry.   Neurological:      General: No focal deficit present.      Mental Status: She is alert and oriented to person, place, and time.   Psychiatric:         Mood and Affect: Mood normal.         Behavior: Behavior normal.         Thought Content: Thought content normal.         Judgment: Judgment normal.          PAT AIRWAY:   Airway:     Mallampati::  III    TM distance::  >3 FB    Neck ROM::  Full  normal          /69   Pulse (!) 114   Temp 37 °C (98.6 °F) (Temporal)   Ht 1.702 m (5' 7\")   Wt 96.4 kg (212 lb 9.6 oz)   SpO2 98%   BMI 33.30 kg/m²       ASA: I  JR: 1.9%  RCRI: 0.4%  DASI Risk Score      Flowsheet Row Pre-Admission Testing from 12/5/2024 in [facility] Questionnaire Series Submission from 11/13/2024 in [facility] with Generic Provider Renee   Can you take care of yourself (eat, dress, bathe, or use toilet)?  2.75 filed at 12/05/2024 1041 2.75  filed at 11/13/2024 1310   Can you walk indoors, such as around your house? 1.75 filed at 12/05/2024 1041 1.75  filed at 11/13/2024 1310   Can you walk a block or two on level ground?  2.75 filed at 12/05/2024 1041 2.75  filed at 11/13/2024 1310   Can you climb a flight of stairs or walk up a hill? 5.5 filed at 12/05/2024 1041 5.5  filed at 11/13/2024 1310   Can you run a short distance? 8 filed at 12/05/2024 1041 8  filed at 11/13/2024 1310   Can you do light work around the house like dusting or washing dishes? 2.7 filed at 12/05/2024 1041 2.7  filed at 11/13/2024 1310   Can you do moderate work around the house like vacuuming, " sweeping floors or carrying groceries? 3.5 filed at 12/05/2024 1041 3.5  filed at 11/13/2024 1310   Can you do heavy work around the house like scrubbing floors or lifting and moving heavy furniture?  8 filed at 12/05/2024 1040 8  filed at 11/13/2024 1310   Can you do yard work like raking leaves, weeding or pushing a mower? 4.5 filed at 12/05/2024 1041 4.5  filed at 11/13/2024 1310   Can you have sexual relations? 5.25 filed at 12/05/2024 1041 5.25  filed at 11/13/2024 1310   Can you participate in moderate recreational activities like golf, bowling, dancing, doubles tennis or throwing a baseball or football? 6 filed at 12/05/2024 1041 6  filed at 11/13/2024 1310   Can you participate in strenous sports like swimming, singles tennis, football, basketball, or skiing? 0 filed at 12/05/2024 1041 7.5  filed at 11/13/2024 1310   DASI SCORE -- 58.2  filed at 11/13/2024 1310   METS Score (Will be calculated only when all the questions are answered) -- 9.9  filed at 11/13/2024 1310          Caprini DVT Assessment    No data to display       Modified Frailty Index    No data to display       CHADS2 Stroke Risk  Current as of 13 minutes ago        N/A 3 to 100%: High Risk   2 to < 3%: Medium Risk   0 to < 2%: Low Risk     Last Change: N/A          This score determines the patient's risk of having a stroke if the patient has atrial fibrillation.        This score is not applicable to this patient. Components are not calculated.          Revised Cardiac Risk Index      Flowsheet Row Pre-Admission Testing from 12/5/2024 in [facility]   High-Risk Surgery (Intraperitoneal, Intrathoracic,Suprainguinal vascular) 0 filed at 12/05/2024 1135   History of ischemic heart disease (History of MI, History of positive exercuse test, Current chest paint considered due to myocardial ischemia, Use of nitrate therapy, ECG with pathological Q Waves) 0 filed at 12/05/2024 1135   History of congestive heart failure (pulmonary  edemia, bilateral rales or S3 gallop, Paroxysmal nocturnal dyspnea, CXR showing pulmonary vascular redistribution) 0 filed at 12/05/2024 1135   History of cerebrovascular disease (Prior TIA or stroke) 0 filed at 12/05/2024 1135   Pre-operative insulin treatment 0 filed at 12/05/2024 1135   Pre-operative creatinine>2 mg/dl 0 filed at 12/05/2024 1135   Revised Cardiac Risk Calculator 0 filed at 12/05/2024 1135          Apfel Simplified Score    No data to display       Risk Analysis Index Results This Encounter    No data found in the last 10 encounters.       Stop Bang Score      Flowsheet Row Pre-Admission Testing from 12/5/2024 in [facility] Questionnaire Series Submission from 11/13/2024 in [facility] with Generic Provider Renee   Do you snore loudly? 0 filed at 12/05/2024 1039 0  filed at 11/13/2024 1310   Do you often feel tired or fatigued after your sleep? 0 filed at 12/05/2024 1039 0  filed at 11/13/2024 1310   Has anyone ever observed you stop breathing in your sleep? 1 filed at 12/05/2024 1039 1  filed at 11/13/2024 1310   Do you have or are you being treated for high blood pressure? 0 filed at 12/05/2024 1039 0  filed at 11/13/2024 1310   Recent BMI (Calculated) 33.3 filed at 12/05/2024 1039 32.6  filed at 11/13/2024 1310   Is BMI greater than 35 kg/m2? 0=No filed at 12/05/2024 1039 0=No  filed at 11/13/2024 1310   Age older than 50 years old? 1=Yes filed at 12/05/2024 1039 1=Yes  filed at 11/13/2024 1310   Is your neck circumference greater than 17 inches (Male) or 16 inches (Female)? 0 filed at 12/05/2024 1039 --   Gender - Male 0=No filed at 12/05/2024 1039 0=No  filed at 11/13/2024 1310   STOP-BANG Total Score 2 filed at 12/05/2024 1039 --          Prodigy: High Risk  Total Score: 0          ARISCAT Score for Postoperative Pulmonary Complications    No data to display       Neto Perioperative Risk for Myocardial Infarction or Cardiac Arrest (EUGENIO)    No data to display          Assessment and Plan:     Chronic cholecystitis : Cholecystectomy Robot-Assisted   ER visit in Oct for dizziness and hives : Dx  allergic reaction. Patient denies any episodes of dizziness since but states she did wake up with hives once.      LABS: 10/24/24  EKG  10/24/24    Addis Tapia, APRN-CNP

## 2024-12-05 NOTE — PREPROCEDURE INSTRUCTIONS
Medication List            Accurate as of December 5, 2024 10:44 AM. Always use your most recent med list.                cetirizine 10 mg chewable tablet  Commonly known as: ZyrTEC  Medication Adjustments for Surgery: Do Not take on the morning of surgery     cholecalciferol 50 MCG (2000 UT) tablet  Commonly known as: Vitamin D-3  Additional Medication Adjustments for Surgery: Take last dose 7 days before surgery     diphenhydrAMINE 25 mg tablet  Commonly known as: Sominex  Take 1 tablet (25 mg) by mouth every 6 hours for 5 days.  Notes to patient: PATIENT IS NOT TAKING THIS MEDICATION     ibuprofen 200 mg tablet  Additional Medication Adjustments for Surgery: Take last dose 7 days before surgery     omeprazole 20 mg DR capsule  Commonly known as: PriLOSEC  Medication Adjustments for Surgery: Take/Use as prescribed  Notes to patient: MAY TAKE MORNING OF SURGERY IF NEEDED     Super Omega-3 1,000 mg capsule capsule  Generic drug: omega-3  Additional Medication Adjustments for Surgery: Take last dose 7 days before surgery     Vitamin C 1,000 mg tablet  Generic drug: ascorbic acid  Additional Medication Adjustments for Surgery: Take last dose 7 days before surgery                              NPO Instructions:    Do not eat any food after midnight the night before your surgery/procedure.    Additional Instructions:     Day of Surgery:  Review your medication instructions, take indicated medications  Wear  comfortable loose fitting clothing  Do not use moisturizers, creams, lotions or perfume  All jewelry and valuables should be left at home                Why must I stop eating and drinking near surgery time?  With sedation, food or liquid in your stomach can enter your lungs causing serious complications  Increases nausea and vomiting    When do I need to stop eating and drinking before my surgery?   Do not eat or drink after midnight the night before your surgery/procedure.  You may have small sips of water to  take your medication.    PAT DISCHARGE INSTRUCTIONS    Please call the Same Day Surgery (SDS) Department of the hospital where your procedure will be performed after 2:00 PM the day before your surgery. If you are scheduled on a Monday, or a Tuesday following a Monday holiday, you will need to call on the last business day prior to your surgery.      [address], [id number] [phone]  Second Floor    Please let your surgeon know if:      You develop any open sores, shingles, burning or painful urination as these may increase your risk of an infection.   You no longer wish to have the surgery.   Any other personal circumstances change that may lead to the need to cancel or defer this surgery-such as being sick or getting admitted to any hospital within one week of your planned procedure.    Your contact details change, such as a change of address or phone number.    Starting now:     Please DO NOT drink alcohol or smoke for 24 hours before surgery. It is well known that quitting smoking can make a huge difference to your health and recovery from surgery. The longer you abstain from smoking, the better your chances of a healthy recovery. If you need help with quitting, call 6-607-QUIT-NOW to be connected to a trained counselor who will discuss the best methods to help you quit.     Before your surgery:    Please stop all supplements 7 days prior to surgery. Or as directed by your surgeon.   Please stop taking NSAID pain medicine such as Advil and Motrin 7 days before surgery.    If you develop any fever, cough, cold, rashes, cuts, scratches, scrapes, urinary symptoms or infection anywhere on your body (including teeth and gums) prior to surgery, please call your surgeon’s office as soon as possible. This may require treatment to reduce the chance of cancellation on the day of surgery.    The day before your surgery:   DIET- Please follow the diet  instructions at the top of your packet.   Get a good night’s rest.  Use the special soap for bathing if you have been instructed to use one.    Scheduled surgery times may change and you will be notified if this occurs - please check your personal voicemail for any updates.     On the morning of surgery:   Wear comfortable, loose fitting clothes which open in the front. Please do not wear moisturizers, creams, lotions, makeup or perfume.    Please bring with you to surgery:   Photo ID and insurance card   Current list of medicines and allergies   Pacemaker/ Defibrillator/Heart stent cards   CPAP machine and mask    Slings/ splints/ crutches   A copy of your complete advanced directive/DHPOA.    Please do NOT bring with you to surgery:   All jewelry and valuables should be left at home.   Prosthetic devices such as contact lenses, hearing aids, dentures, eyelash extensions, hairpins and body piercings must be removed prior to going in to the surgical suite.    After outpatient surgery:   A responsible adult MUST accompany you at the time of discharge and stay with you for 24 hours after your surgery. You may NOT drive yourself home after surgery.    Do not drive, operate machinery, make critical decisions or do activities that require co-ordination or balance until after a night’s sleep.   Do not drink alcoholic beverages for 24 hours.   Instructions for resuming your medications will be provided by your surgeon.    CALL YOUR DOCTOR AFTER SURGERY IF YOU HAVE:     Chills and/or a fever of 101° F or higher.    Redness, swelling, pus or drainage from your surgical wound or a bad smell from the wound.    Lightheadedness, fainting or confusion.    Persistent vomiting (throwing up) and are not able to eat or drink for 12 hours.    Three or more loose, watery bowel movements in 24 hours (diarrhea).   Difficulty or pain while urinating( after non-urological surgery)    Pain and swelling in your legs, especially if it is only  on one side.    Difficulty breathing or are breathing faster than normal.    Any new concerning symptoms.

## 2024-12-05 NOTE — CPM/PAT H&P
CPM/PAT Evaluation       Name: Mayuri La (Mayuri La)  /Age: 1965/59 y.o.     In-Person       Chief Complaint: abdominal pain    HPI: Mayuri La is a 59 year old female that has complaints of abdominal pain with nausea, chills and vomiting. She states she has bouts of abdominal pain on and off since 2023. She states this year she had three bad episodes that lasted hours before any relief. She was sent for for an US of the gallbladder and it showed:  IMPRESSION:  1. The gallbladder is filled with sludge and numerous punctate  echogenicities probably tiny calculi. There is also a larger 1.7 cm  calculus in the neck of the gallbladder. Gallbladder wall thickening  noted. No pericholecystic fluid. Recommend surgical consultation.  2. Common bile duct minimally dilated at 8 mm. No intrahepatic  biliary distention.    She denies any RUQ discomfort when you press down. She denies constipation or diarrhea. She is scheduled for a cholecystectomy. She denies fever, chills, chest pain, sob, dizziness, and palpitations.    No past medical history on file.    Past Surgical History:   Procedure Laterality Date    OTHER SURGICAL HISTORY  2022    [place] tooth extraction       Patient  reports being sexually active and has had partner(s) who are male. She reports using the following method of birth control/protection: Post-menopausal.  Social History     Tobacco Use    Smoking status: Never    Smokeless tobacco: Never   Substance Use Topics    Alcohol use: Yes     Alcohol/week: 4.0 standard drinks of alcohol     Types: 4 Glasses of wine per week     Social History     Substance and Sexual Activity   Drug Use Never       Family History   Problem Relation Name Age of Onset    Asthma Mother Yajaira Warm     No Known Problems Father      Breast cancer Maternal Grandmother Crista Cano 60       Allergies   Allergen Reactions    Erythromycin Unknown    Penicillins Hives    Sulfa (Sulfonamide  Antibiotics) Hives     Current Outpatient Medications   Medication Sig Dispense Refill    ascorbic acid (Vitamin C) 1,000 mg tablet Take 1 tablet (1,000 mg) by mouth once daily.      cetirizine (ZyrTEC) 10 mg chewable tablet Chew 1 tablet (10 mg) once daily as needed for allergies or rhinitis.      cholecalciferol (Vitamin D-3) 50 MCG (2000 UT) tablet Take 1 tablet (50 mcg) by mouth once daily.      diphenhydrAMINE (Sominex) 25 mg tablet Take 1 tablet (25 mg) by mouth every 6 hours for 5 days. 20 tablet 0    ibuprofen 200 mg tablet Take 4 tablets (800 mg) by mouth every 8 hours if needed for mild pain (1 - 3) or moderate pain (4 - 6).      omega-3 (Super Omega-3) 1,000 mg capsule capsule Take 1 capsule (1,000 mg) by mouth once daily.      omeprazole (PriLOSEC) 20 mg DR capsule Take 1 capsule (20 mg) by mouth once daily as needed (GERD). Do not crush or chew.       No current facility-administered medications for this visit.       Review of Systems   Constitutional: Negative.    HENT: Negative.     Eyes: Negative.    Respiratory: Negative.     Cardiovascular: Negative.    Gastrointestinal:  Positive for abdominal pain.   Endocrine: Negative.    Genitourinary: Negative.    Musculoskeletal: Negative.    Skin: Negative.    Allergic/Immunologic: Negative.    Neurological: Negative.    Hematological: Negative.    Psychiatric/Behavioral: Negative.                Physical Exam  Vitals reviewed.   Constitutional:       Appearance: Normal appearance.   HENT:      Head: Normocephalic and atraumatic.      Nose: Nose normal.      Mouth/Throat:      Mouth: Mucous membranes are moist.      Pharynx: Oropharynx is clear.   Eyes:      Extraocular Movements: Extraocular movements intact.      Conjunctiva/sclera: Conjunctivae normal.      Pupils: Pupils are equal, round, and reactive to light.   Cardiovascular:      Rate and Rhythm: Normal rate and regular rhythm.      Pulses: Normal pulses.      Heart sounds: Normal heart sounds.  "  Pulmonary:      Effort: Pulmonary effort is normal.      Breath sounds: Normal breath sounds.   Abdominal:      General: Bowel sounds are normal.      Palpations: Abdomen is soft.   Genitourinary:     Comments: Assessment deferred to physician  Musculoskeletal:         General: Normal range of motion.      Cervical back: Normal range of motion and neck supple.   Skin:     General: Skin is warm and dry.   Neurological:      General: No focal deficit present.      Mental Status: She is alert and oriented to person, place, and time.   Psychiatric:         Mood and Affect: Mood normal.         Behavior: Behavior normal.         Thought Content: Thought content normal.         Judgment: Judgment normal.          PAT AIRWAY:   Airway:     Mallampati::  III    TM distance::  >3 FB    Neck ROM::  Full  normal          /69   Pulse (!) 114   Temp 37 °C (98.6 °F) (Temporal)   Ht 1.702 m (5' 7\")   Wt 96.4 kg (212 lb 9.6 oz)   SpO2 98%   BMI 33.30 kg/m²       ASA: I  JR: 1.9%  RCRI: 0.4%  DASI Risk Score      Flowsheet Row Pre-Admission Testing from 12/5/2024 in [facility] Questionnaire Series Submission from 11/13/2024 in [facility] with Generic Provider Renee   Can you take care of yourself (eat, dress, bathe, or use toilet)?  2.75 filed at 12/05/2024 1041 2.75  filed at 11/13/2024 1310   Can you walk indoors, such as around your house? 1.75 filed at 12/05/2024 1041 1.75  filed at 11/13/2024 1310   Can you walk a block or two on level ground?  2.75 filed at 12/05/2024 1041 2.75  filed at 11/13/2024 1310   Can you climb a flight of stairs or walk up a hill? 5.5 filed at 12/05/2024 1041 5.5  filed at 11/13/2024 1310   Can you run a short distance? 8 filed at 12/05/2024 1041 8  filed at 11/13/2024 1310   Can you do light work around the house like dusting or washing dishes? 2.7 filed at 12/05/2024 1041 2.7  filed at 11/13/2024 1310   Can you do moderate work around the house like vacuuming, " sweeping floors or carrying groceries? 3.5 filed at 12/05/2024 1041 3.5  filed at 11/13/2024 1310   Can you do heavy work around the house like scrubbing floors or lifting and moving heavy furniture?  8 filed at 12/05/2024 1040 8  filed at 11/13/2024 1310   Can you do yard work like raking leaves, weeding or pushing a mower? 4.5 filed at 12/05/2024 1041 4.5  filed at 11/13/2024 1310   Can you have sexual relations? 5.25 filed at 12/05/2024 1041 5.25  filed at 11/13/2024 1310   Can you participate in moderate recreational activities like golf, bowling, dancing, doubles tennis or throwing a baseball or football? 6 filed at 12/05/2024 1041 6  filed at 11/13/2024 1310   Can you participate in strenous sports like swimming, singles tennis, football, basketball, or skiing? 0 filed at 12/05/2024 1041 7.5  filed at 11/13/2024 1310   DASI SCORE -- 58.2  filed at 11/13/2024 1310   METS Score (Will be calculated only when all the questions are answered) -- 9.9  filed at 11/13/2024 1310          Caprini DVT Assessment    No data to display       Modified Frailty Index    No data to display       CHADS2 Stroke Risk  Current as of 13 minutes ago        N/A 3 to 100%: High Risk   2 to < 3%: Medium Risk   0 to < 2%: Low Risk     Last Change: N/A          This score determines the patient's risk of having a stroke if the patient has atrial fibrillation.        This score is not applicable to this patient. Components are not calculated.          Revised Cardiac Risk Index      Flowsheet Row Pre-Admission Testing from 12/5/2024 in [facility]   High-Risk Surgery (Intraperitoneal, Intrathoracic,Suprainguinal vascular) 0 filed at 12/05/2024 1135   History of ischemic heart disease (History of MI, History of positive exercuse test, Current chest paint considered due to myocardial ischemia, Use of nitrate therapy, ECG with pathological Q Waves) 0 filed at 12/05/2024 1135   History of congestive heart failure (pulmonary  edemia, bilateral rales or S3 gallop, Paroxysmal nocturnal dyspnea, CXR showing pulmonary vascular redistribution) 0 filed at 12/05/2024 1135   History of cerebrovascular disease (Prior TIA or stroke) 0 filed at 12/05/2024 1135   Pre-operative insulin treatment 0 filed at 12/05/2024 1135   Pre-operative creatinine>2 mg/dl 0 filed at 12/05/2024 1135   Revised Cardiac Risk Calculator 0 filed at 12/05/2024 1135          Apfel Simplified Score    No data to display       Risk Analysis Index Results This Encounter    No data found in the last 10 encounters.       Stop Bang Score      Flowsheet Row Pre-Admission Testing from 12/5/2024 in [facility] Questionnaire Series Submission from 11/13/2024 in [facility] with Generic Provider Renee   Do you snore loudly? 0 filed at 12/05/2024 1039 0  filed at 11/13/2024 1310   Do you often feel tired or fatigued after your sleep? 0 filed at 12/05/2024 1039 0  filed at 11/13/2024 1310   Has anyone ever observed you stop breathing in your sleep? 1 filed at 12/05/2024 1039 1  filed at 11/13/2024 1310   Do you have or are you being treated for high blood pressure? 0 filed at 12/05/2024 1039 0  filed at 11/13/2024 1310   Recent BMI (Calculated) 33.3 filed at 12/05/2024 1039 32.6  filed at 11/13/2024 1310   Is BMI greater than 35 kg/m2? 0=No filed at 12/05/2024 1039 0=No  filed at 11/13/2024 1310   Age older than 50 years old? 1=Yes filed at 12/05/2024 1039 1=Yes  filed at 11/13/2024 1310   Is your neck circumference greater than 17 inches (Male) or 16 inches (Female)? 0 filed at 12/05/2024 1039 --   Gender - Male 0=No filed at 12/05/2024 1039 0=No  filed at 11/13/2024 1310   STOP-BANG Total Score 2 filed at 12/05/2024 1039 --          Prodigy: High Risk  Total Score: 0          ARISCAT Score for Postoperative Pulmonary Complications    No data to display       Neto Perioperative Risk for Myocardial Infarction or Cardiac Arrest (EUGENIO)    No data to display          Assessment and Plan:     Chronic cholecystitis : Cholecystectomy Robot-Assisted   ER visit in Oct for dizziness and hives : Dx  allergic reaction. Patient denies any episodes of dizziness since but states she did wake up with hives once.      LABS: 10/24/24  EKG  10/24/24    Addis Tapia, APRN-CNP

## 2024-12-11 ENCOUNTER — APPOINTMENT (OUTPATIENT)
Dept: GASTROENTEROLOGY | Facility: CLINIC | Age: 59
End: 2024-12-11
Payer: COMMERCIAL

## 2024-12-12 ENCOUNTER — PHARMACY VISIT (OUTPATIENT)
Dept: PHARMACY | Facility: CLINIC | Age: 59
End: 2024-12-12
Payer: COMMERCIAL

## 2024-12-12 ENCOUNTER — HOSPITAL ENCOUNTER (OUTPATIENT)
Facility: HOSPITAL | Age: 59
Setting detail: OUTPATIENT SURGERY
Discharge: HOME | End: 2024-12-12
Attending: STUDENT IN AN ORGANIZED HEALTH CARE EDUCATION/TRAINING PROGRAM | Admitting: STUDENT IN AN ORGANIZED HEALTH CARE EDUCATION/TRAINING PROGRAM
Payer: COMMERCIAL

## 2024-12-12 ENCOUNTER — ANESTHESIA EVENT (OUTPATIENT)
Dept: OPERATING ROOM | Facility: HOSPITAL | Age: 59
End: 2024-12-12
Payer: COMMERCIAL

## 2024-12-12 ENCOUNTER — ANESTHESIA (OUTPATIENT)
Dept: OPERATING ROOM | Facility: HOSPITAL | Age: 59
End: 2024-12-12
Payer: COMMERCIAL

## 2024-12-12 VITALS
DIASTOLIC BLOOD PRESSURE: 66 MMHG | SYSTOLIC BLOOD PRESSURE: 121 MMHG | TEMPERATURE: 97.2 F | WEIGHT: 212.6 LBS | HEART RATE: 76 BPM | OXYGEN SATURATION: 100 % | BODY MASS INDEX: 33.37 KG/M2 | RESPIRATION RATE: 16 BRPM | HEIGHT: 67 IN

## 2024-12-12 DIAGNOSIS — K81.1 CHRONIC CHOLECYSTITIS: ICD-10-CM

## 2024-12-12 PROCEDURE — 3700000001 HC GENERAL ANESTHESIA TIME - INITIAL BASE CHARGE: Performed by: STUDENT IN AN ORGANIZED HEALTH CARE EDUCATION/TRAINING PROGRAM

## 2024-12-12 PROCEDURE — 2500000004 HC RX 250 GENERAL PHARMACY W/ HCPCS (ALT 636 FOR OP/ED): Mod: JZ | Performed by: STUDENT IN AN ORGANIZED HEALTH CARE EDUCATION/TRAINING PROGRAM

## 2024-12-12 PROCEDURE — RXMED WILLOW AMBULATORY MEDICATION CHARGE

## 2024-12-12 PROCEDURE — 2720000007 HC OR 272 NO HCPCS: Performed by: STUDENT IN AN ORGANIZED HEALTH CARE EDUCATION/TRAINING PROGRAM

## 2024-12-12 PROCEDURE — 2500000004 HC RX 250 GENERAL PHARMACY W/ HCPCS (ALT 636 FOR OP/ED): Performed by: ANESTHESIOLOGY

## 2024-12-12 PROCEDURE — S2900 ROBOTIC SURGICAL SYSTEM: HCPCS | Performed by: STUDENT IN AN ORGANIZED HEALTH CARE EDUCATION/TRAINING PROGRAM

## 2024-12-12 PROCEDURE — 2780000003 HC OR 278 NO HCPCS: Performed by: STUDENT IN AN ORGANIZED HEALTH CARE EDUCATION/TRAINING PROGRAM

## 2024-12-12 PROCEDURE — 7100000001 HC RECOVERY ROOM TIME - INITIAL BASE CHARGE: Performed by: STUDENT IN AN ORGANIZED HEALTH CARE EDUCATION/TRAINING PROGRAM

## 2024-12-12 PROCEDURE — 7100000010 HC PHASE TWO TIME - EACH INCREMENTAL 1 MINUTE: Performed by: STUDENT IN AN ORGANIZED HEALTH CARE EDUCATION/TRAINING PROGRAM

## 2024-12-12 PROCEDURE — 88304 TISSUE EXAM BY PATHOLOGIST: CPT | Mod: TC | Performed by: STUDENT IN AN ORGANIZED HEALTH CARE EDUCATION/TRAINING PROGRAM

## 2024-12-12 PROCEDURE — 2500000004 HC RX 250 GENERAL PHARMACY W/ HCPCS (ALT 636 FOR OP/ED): Performed by: STUDENT IN AN ORGANIZED HEALTH CARE EDUCATION/TRAINING PROGRAM

## 2024-12-12 PROCEDURE — 47562 LAPAROSCOPIC CHOLECYSTECTOMY: CPT | Performed by: STUDENT IN AN ORGANIZED HEALTH CARE EDUCATION/TRAINING PROGRAM

## 2024-12-12 PROCEDURE — 2500000001 HC RX 250 WO HCPCS SELF ADMINISTERED DRUGS (ALT 637 FOR MEDICARE OP): Performed by: ANESTHESIOLOGY

## 2024-12-12 PROCEDURE — 7100000009 HC PHASE TWO TIME - INITIAL BASE CHARGE: Performed by: STUDENT IN AN ORGANIZED HEALTH CARE EDUCATION/TRAINING PROGRAM

## 2024-12-12 PROCEDURE — 3600000018 HC OR TIME - INITIAL BASE CHARGE - PROCEDURE LEVEL SIX: Performed by: STUDENT IN AN ORGANIZED HEALTH CARE EDUCATION/TRAINING PROGRAM

## 2024-12-12 PROCEDURE — 3600000017 HC OR TIME - EACH INCREMENTAL 1 MINUTE - PROCEDURE LEVEL SIX: Performed by: STUDENT IN AN ORGANIZED HEALTH CARE EDUCATION/TRAINING PROGRAM

## 2024-12-12 PROCEDURE — 3700000002 HC GENERAL ANESTHESIA TIME - EACH INCREMENTAL 1 MINUTE: Performed by: STUDENT IN AN ORGANIZED HEALTH CARE EDUCATION/TRAINING PROGRAM

## 2024-12-12 PROCEDURE — 2500000004 HC RX 250 GENERAL PHARMACY W/ HCPCS (ALT 636 FOR OP/ED): Performed by: ANESTHESIOLOGIST ASSISTANT

## 2024-12-12 PROCEDURE — 7100000002 HC RECOVERY ROOM TIME - EACH INCREMENTAL 1 MINUTE: Performed by: STUDENT IN AN ORGANIZED HEALTH CARE EDUCATION/TRAINING PROGRAM

## 2024-12-12 RX ORDER — LIDOCAINE HYDROCHLORIDE AND EPINEPHRINE 10; 10 UG/ML; MG/ML
INJECTION, SOLUTION INFILTRATION; PERINEURAL AS NEEDED
Status: DISCONTINUED | OUTPATIENT
Start: 2024-12-12 | End: 2024-12-12 | Stop reason: HOSPADM

## 2024-12-12 RX ORDER — HYDROMORPHONE HYDROCHLORIDE 0.2 MG/ML
0.2 INJECTION INTRAMUSCULAR; INTRAVENOUS; SUBCUTANEOUS EVERY 5 MIN PRN
Status: DISCONTINUED | OUTPATIENT
Start: 2024-12-12 | End: 2024-12-12 | Stop reason: HOSPADM

## 2024-12-12 RX ORDER — ALBUTEROL SULFATE 0.83 MG/ML
2.5 SOLUTION RESPIRATORY (INHALATION) ONCE AS NEEDED
Status: DISCONTINUED | OUTPATIENT
Start: 2024-12-12 | End: 2024-12-12 | Stop reason: HOSPADM

## 2024-12-12 RX ORDER — ONDANSETRON HYDROCHLORIDE 2 MG/ML
INJECTION, SOLUTION INTRAVENOUS AS NEEDED
Status: DISCONTINUED | OUTPATIENT
Start: 2024-12-12 | End: 2024-12-12

## 2024-12-12 RX ORDER — OXYCODONE HYDROCHLORIDE 5 MG/1
5 TABLET ORAL EVERY 4 HOURS PRN
Status: DISCONTINUED | OUTPATIENT
Start: 2024-12-12 | End: 2024-12-12 | Stop reason: HOSPADM

## 2024-12-12 RX ORDER — ACETAMINOPHEN 500 MG
1000 TABLET ORAL EVERY 6 HOURS PRN
Qty: 30 TABLET | Refills: 0 | Status: SHIPPED | OUTPATIENT
Start: 2024-12-12

## 2024-12-12 RX ORDER — HYDRALAZINE HYDROCHLORIDE 20 MG/ML
5 INJECTION INTRAMUSCULAR; INTRAVENOUS EVERY 30 MIN PRN
Status: DISCONTINUED | OUTPATIENT
Start: 2024-12-12 | End: 2024-12-12 | Stop reason: HOSPADM

## 2024-12-12 RX ORDER — IPRATROPIUM BROMIDE 0.5 MG/2.5ML
500 SOLUTION RESPIRATORY (INHALATION) ONCE
Status: DISCONTINUED | OUTPATIENT
Start: 2024-12-12 | End: 2024-12-12 | Stop reason: HOSPADM

## 2024-12-12 RX ORDER — CEFAZOLIN SODIUM 2 G/100ML
2 INJECTION, SOLUTION INTRAVENOUS ONCE
Status: COMPLETED | OUTPATIENT
Start: 2024-12-12 | End: 2024-12-12

## 2024-12-12 RX ORDER — ONDANSETRON HYDROCHLORIDE 2 MG/ML
4 INJECTION, SOLUTION INTRAVENOUS ONCE AS NEEDED
Status: COMPLETED | OUTPATIENT
Start: 2024-12-12 | End: 2024-12-12

## 2024-12-12 RX ORDER — DIPHENHYDRAMINE HYDROCHLORIDE 50 MG/ML
12.5 INJECTION INTRAMUSCULAR; INTRAVENOUS ONCE AS NEEDED
Status: DISCONTINUED | OUTPATIENT
Start: 2024-12-12 | End: 2024-12-12 | Stop reason: HOSPADM

## 2024-12-12 RX ORDER — PROPOFOL 10 MG/ML
INJECTION, EMULSION INTRAVENOUS AS NEEDED
Status: DISCONTINUED | OUTPATIENT
Start: 2024-12-12 | End: 2024-12-12

## 2024-12-12 RX ORDER — PHENYLEPHRINE HCL IN 0.9% NACL 1 MG/10 ML
SYRINGE (ML) INTRAVENOUS AS NEEDED
Status: DISCONTINUED | OUTPATIENT
Start: 2024-12-12 | End: 2024-12-12

## 2024-12-12 RX ORDER — DEXMEDETOMIDINE HYDROCHLORIDE 100 UG/ML
INJECTION, SOLUTION INTRAVENOUS AS NEEDED
Status: DISCONTINUED | OUTPATIENT
Start: 2024-12-12 | End: 2024-12-12

## 2024-12-12 RX ORDER — GLYCOPYRROLATE 0.2 MG/ML
INJECTION INTRAMUSCULAR; INTRAVENOUS AS NEEDED
Status: DISCONTINUED | OUTPATIENT
Start: 2024-12-12 | End: 2024-12-12

## 2024-12-12 RX ORDER — FENTANYL CITRATE 50 UG/ML
INJECTION, SOLUTION INTRAMUSCULAR; INTRAVENOUS AS NEEDED
Status: DISCONTINUED | OUTPATIENT
Start: 2024-12-12 | End: 2024-12-12

## 2024-12-12 RX ORDER — LIDOCAINE HYDROCHLORIDE 20 MG/ML
INJECTION, SOLUTION INFILTRATION; PERINEURAL AS NEEDED
Status: DISCONTINUED | OUTPATIENT
Start: 2024-12-12 | End: 2024-12-12

## 2024-12-12 RX ORDER — ROCURONIUM BROMIDE 10 MG/ML
INJECTION, SOLUTION INTRAVENOUS AS NEEDED
Status: DISCONTINUED | OUTPATIENT
Start: 2024-12-12 | End: 2024-12-12

## 2024-12-12 RX ORDER — MIDAZOLAM HYDROCHLORIDE 1 MG/ML
INJECTION, SOLUTION INTRAMUSCULAR; INTRAVENOUS AS NEEDED
Status: DISCONTINUED | OUTPATIENT
Start: 2024-12-12 | End: 2024-12-12

## 2024-12-12 RX ORDER — MEPERIDINE HYDROCHLORIDE 25 MG/ML
12.5 INJECTION INTRAMUSCULAR; INTRAVENOUS; SUBCUTANEOUS EVERY 10 MIN PRN
Status: DISCONTINUED | OUTPATIENT
Start: 2024-12-12 | End: 2024-12-12 | Stop reason: HOSPADM

## 2024-12-12 RX ORDER — TIZANIDINE 2 MG/1
4 TABLET ORAL EVERY 6 HOURS PRN
Qty: 30 TABLET | Refills: 0 | Status: SHIPPED | OUTPATIENT
Start: 2024-12-12

## 2024-12-12 SDOH — HEALTH STABILITY: MENTAL HEALTH: CURRENT SMOKER: 0

## 2024-12-12 ASSESSMENT — PAIN SCALES - GENERAL
PAINLEVEL_OUTOF10: 7
PAINLEVEL_OUTOF10: 2
PAINLEVEL_OUTOF10: 2
PAINLEVEL_OUTOF10: 10 - WORST POSSIBLE PAIN
PAINLEVEL_OUTOF10: 10 - WORST POSSIBLE PAIN
PAINLEVEL_OUTOF10: 9
PAINLEVEL_OUTOF10: 4
PAINLEVEL_OUTOF10: 2
PAINLEVEL_OUTOF10: 10 - WORST POSSIBLE PAIN
PAINLEVEL_OUTOF10: 4
PAIN_LEVEL: 0
PAINLEVEL_OUTOF10: 0 - NO PAIN

## 2024-12-12 ASSESSMENT — PAIN - FUNCTIONAL ASSESSMENT
PAIN_FUNCTIONAL_ASSESSMENT: CPOT (CRITICAL CARE PAIN OBSERVATION TOOL)
PAIN_FUNCTIONAL_ASSESSMENT: 0-10
PAIN_FUNCTIONAL_ASSESSMENT: CPOT (CRITICAL CARE PAIN OBSERVATION TOOL)
PAIN_FUNCTIONAL_ASSESSMENT: 0-10

## 2024-12-12 ASSESSMENT — COLUMBIA-SUICIDE SEVERITY RATING SCALE - C-SSRS
1. IN THE PAST MONTH, HAVE YOU WISHED YOU WERE DEAD OR WISHED YOU COULD GO TO SLEEP AND NOT WAKE UP?: NO
2. HAVE YOU ACTUALLY HAD ANY THOUGHTS OF KILLING YOURSELF?: NO
6. HAVE YOU EVER DONE ANYTHING, STARTED TO DO ANYTHING, OR PREPARED TO DO ANYTHING TO END YOUR LIFE?: NO

## 2024-12-12 ASSESSMENT — PAIN DESCRIPTION - LOCATION
LOCATION: ABDOMEN

## 2024-12-12 ASSESSMENT — PAIN DESCRIPTION - DESCRIPTORS: DESCRIPTORS: ACHING

## 2024-12-12 NOTE — PERIOPERATIVE NURSING NOTE
1659--Patient returned to [place] 20 from PACU,  at the bedside and he has the prescriptions already.  Patient is alert and oriented, reports pain is much improved from earlier in PACU.  Patient has 4 lap sites that are covered with dermabond and no drainage noted.  Patient given ice water to drink.    1710--Discharge, medication and wound care instructions given to patient and .  All questions were answered by this RN.    1740--Patient still wants to rest a little longer does not feel she is awake enough to leave.    1817--Patient is more awake.  Vitals signs checked.  Will continue to monitor.    1819--Patient getting dressed with assistance from spouse.    1825--Patient ambulating with a walker in the halls. No issues noted.    1833-- down to get car.

## 2024-12-12 NOTE — ANESTHESIA POSTPROCEDURE EVALUATION
Patient: Mayuri La    Procedure Summary       Date: 12/12/24 Room / Location: [facility] OR 12 / [facility] OR    Anesthesia Start: 1326 Anesthesia Stop: 1539    Procedure: Cholecystectomy Robot-Assisted Diagnosis:       Chronic cholecystitis      (Chronic cholecystitis [K81.1])    Surgeons: Aaliyah Fajardo MD Responsible Provider: Koko Byrnes MD    Anesthesia Type: general ASA Status: 2            Anesthesia Type: general    Vitals Value Taken Time   /72 12/12/24 1657   Temp 36 °C (96.8 °F) 12/12/24 1651   Pulse 77 12/12/24 1657   Resp 15 12/12/24 1657   SpO2 95 % 12/12/24 1657       Anesthesia Post Evaluation    Patient location during evaluation: PACU  Patient participation: complete - patient participated  Level of consciousness: awake  Pain score: 0  Pain management: adequate  Multimodal analgesia pain management approach  Airway patency: patent  Two or more strategies used to mitigate risk of obstructive sleep apnea  Cardiovascular status: acceptable  Respiratory status: acceptable  Hydration status: acceptable  Postoperative Nausea and Vomiting: none        There were no known notable events for this encounter.

## 2024-12-12 NOTE — OP NOTE
Cholecystectomy Robot-Assisted Operative Note     Date: 2024  OR Location: [facility] OR    Name: Mayuri La, : 1965, Age: 59 y.o., MRN: [id number], Sex: female    Diagnosis  Pre-op Diagnosis      * Chronic cholecystitis [K81.1] Post-op Diagnosis     * Chronic cholecystitis [K81.1]     Procedures  Cholecystectomy Robot-Assisted  [id number] - MI LAPAROSCOPY SURG CHOLECYSTECTOMY      Surgeons      * Aaliyah Fajardo - Primary    Resident/Fellow/Other Assistant:  Surgeons and Role:  * No surgeons found with a matching role *    Staff:   Jaelynulator: Irena Rodriguez Person: Tori Rodriguez Person: [facility]  Surgical Assistant: Hermelindo    Anesthesia Staff: Anesthesiologist: Koko Byrnes MD  C-AA: GISELE [place]    Procedure Summary  Anesthesia: Anesthesia type not filed in the log.  ASA: ASA status not filed in the log.  Estimated Blood Loss: 10mL  Intra-op Medications:   Administrations occurring from 1200 to 1400 on 24:   Medication Name Total Dose   dexmedeTOMIDine (Precedex) 100 mcg/mL 2 mL single dose vial 20 mcg   fentaNYL PF 0.05 mg/mL 100 mcg   glycopyrrolate (Robinul) injection 0.2 mg   LR bolus Cannot be calculated   lidocaine (Xylocaine) injection 2 % 50 mg   midazolam (Versed) 1 mg/1 mL 2 mg   propofol (Diprivan) injection 10 mg/mL 200 mg   rocuronium (ZeMuron) 50 mg/5 mL injection 80 mg   ceFAZolin (Ancef) 2 g in dextrose (iso)  mL 2 g              Anesthesia Record               Intraprocedure I/O Totals          Intake    LR bolus 1800.00 mL    Total Intake 1800 mL       Output    Est. Blood Loss 10 mL    Total Output 10 mL       Net    Net Volume 1790 mL          Specimen:   ID Type Source Tests Collected by Time   1 : Gallbladder with content. Tissue GALLBLADDER CHOLECYSTECTOMY SURGICAL PATHOLOGY EXAM Aaliyah Fajardo MD 2024 1511        Findings: Thickened gallbladder wall with pericholecystic fluid and numerous stones.  Many large collateral vessels running to the gall bladder from the  cystic artery and also a branch that dove back into the liver    Indications: Mayuri La is an 59 y.o. female who is having surgery for Chronic cholecystitis [K81.1].     The patient was seen in the preoperative area. The risks, benefits, complications, treatment options, non-operative alternatives, expected recovery and outcomes were discussed with the patient. The possibilities of reaction to medication, pulmonary aspiration, injury to surrounding structures, bleeding, recurrent infection, the need for additional procedures, failure to diagnose a condition, and creating a complication requiring transfusion or operation were discussed with the patient. The patient concurred with the proposed plan, giving informed consent.  The site of surgery was properly noted/marked if necessary per policy. The patient has been actively warmed in preoperative area. Preoperative antibiotics have been ordered and given within 1 hours of incision. Venous thrombosis prophylaxis have been ordered including bilateral sequential compression devices    Procedure Details:   The patient was placed on the operating room table in the supine position.  General anesthesia was induced.  The arms were tucked.  The abdomen was prepped and draped according to standard sterile fashion.  An incision was made over the umbilicus and a Veress needle was used to gain entrance into the abdomen.  After negative aspiration and positive saline drop test, the abdomen was insufflated to 15 mmHg.  A 11mm trochar was placed using the robotic camera [employer] and a robotic trocar was placed through this. Inspection of the abdomen to ensure that there was no iatrogenic injury was performed.  3 additional robotic trocars were placed under direct visualization after instillation of local anesthesia.   The patient was placed head up and right side up and the gallbladder was identified underneath the liver. The robot was brought onto the field and docked to the  ports, and I went over to the console for further management.  The gallbladder was grasped and elevated cephalad and the peritoneum over top of the gallbladder was incised using the scissors and combination of cautery and blunt dissection was used to identify the critical view.  There were numerous collaterals extending off of the cystic artery.  There was also a branch noted to dive back down into the liver and several large collaterals extended off of this as well.  Each of these were dissected out.  The duct was clipped and transected.  Alternating between a bottom up and top-down approach, each collateral was clipped and transected. Hemostasis was ensured along the liver bed.  The trocars were undocked and the robot removed from the field.  The gallbladder was then placed in an Endo Catch bag and removed from the umbilicus. The incision had to be elongated some to accommodate the large stones.  The patient was leveled out and the umbilical incision was closed using a figure-of-eight stitch and a suture passer.  The skin incisions were closed with Monocryl and Dermabond.  The patient tolerated the procedure well.  General anesthesia was reversed.  The patient was transferred to PACU in stable condition.     Complications:  None; patient tolerated the procedure well.    Disposition: PACU - hemodynamically stable.  Condition: stable         Aaliyah Cainfareed  Phone Number: [phone]

## 2024-12-12 NOTE — DISCHARGE INSTRUCTIONS
Keep incisions clean and dry.  There are dissolvable stitches and waterproof glue over top.  You can shower and let soap and water run over the incisions.  The glue will fall off in 1 to 2 weeks.    No heavy lifting more than 10 pounds for 6 weeks to prevent hernia formation after surgery.    Call the clinic with questions or concerns.    
Parents

## 2024-12-12 NOTE — ANESTHESIA PREPROCEDURE EVALUATION
Patient: Mayuri La    Procedure Information       Anesthesia Start Date/Time: 12/12/24 1326    Procedure: Cholecystectomy Robot-Assisted    Location: [facility] / [facility]    Surgeons: Aaliyah Fajardo MD            Relevant Problems   Anesthesia (within normal limits)      Cardiac   (+) Hyperlipidemia      Pulmonary (within normal limits)      Neuro (within normal limits)      GI (within normal limits)      /Renal (within normal limits)      Liver   (+) Chronic cholecystitis      Endocrine   (+) Obesity      Hematology (within normal limits)      Musculoskeletal (within normal limits)      HEENT (within normal limits)      ID (within normal limits)      Skin (within normal limits)      GYN (within normal limits)       Clinical information reviewed:   Tobacco  Allergies  Meds   Med Hx  Surg Hx   Fam Hx  Soc Hx      Allergies   Allergen Reactions    Erythromycin Unknown    Penicillins Hives    Sulfa (Sulfonamide Antibiotics) Hives     Prior to Admission medications    Medication Sig Start Date End Date Taking? Authorizing Provider   cetirizine (ZyrTEC) 10 mg chewable tablet Chew 1 tablet (10 mg) once daily as needed for allergies or rhinitis.   Yes Historical Provider, MD   cholecalciferol (Vitamin D-3) 50 MCG (2000 UT) tablet Take 1 tablet (50 mcg) by mouth once daily.   Yes Historical Provider, MD   ibuprofen 200 mg tablet Take 4 tablets (800 mg) by mouth every 8 hours if needed for mild pain (1 - 3) or moderate pain (4 - 6).   Yes Historical Provider, MD   omega-3 (Super Omega-3) 1,000 mg capsule capsule Take 1 capsule (1,000 mg) by mouth once daily.   Yes Historical Provider, MD   omeprazole (PriLOSEC) 20 mg DR capsule Take 1 capsule (20 mg) by mouth once daily as needed (GERD). Do not crush or chew.  12/12/24 Yes Historical Provider, MD   acetaminophen (Tylenol) 500 mg tablet Take 2 tablets (1,000 mg) by mouth every 6 hours if needed for mild pain (1 - 3). 12/12/24   Aaliyah Fajardo MD   ascorbic acid  (Vitamin C) 1,000 mg tablet Take 1 tablet (1,000 mg) by mouth once daily.    Historical Provider, MD   diphenhydrAMINE (Sominex) 25 mg tablet Take 1 tablet (25 mg) by mouth every 6 hours for 5 days. 10/24/24 10/29/24  Maya Phillip MD   tiZANidine (Zanaflex) 2 mg tablet Take 2 tablets (4 mg) by mouth every 6 hours if needed (pain). 12/12/24   Aaliyah Fajardo MD     History reviewed. No pertinent past medical history.  Past Surgical History:   Procedure Laterality Date    OTHER SURGICAL HISTORY  01/26/2022    [place] tooth extraction     Vitals:    12/12/24 1537   BP:    Pulse:    Resp:    Temp: 35.1 °C (95.2 °F)   SpO2:        Past Surgical History:   Procedure Laterality Date    OTHER SURGICAL HISTORY  01/26/2022    [place] tooth extraction     History reviewed. No pertinent past medical history.    Current Facility-Administered Medications:     lidocaine-epinephrine (Xylocaine W/EPI) 1 %-1:100,000 injection, , , PRN, Aaliyah Fjaardo MD, 20 mL at 12/12/24 1408  Prior to Admission medications    Medication Sig Start Date End Date Taking? Authorizing Provider   cetirizine (ZyrTEC) 10 mg chewable tablet Chew 1 tablet (10 mg) once daily as needed for allergies or rhinitis.   Yes Historical Provider, MD   cholecalciferol (Vitamin D-3) 50 MCG (2000 UT) tablet Take 1 tablet (50 mcg) by mouth once daily.   Yes Historical Provider, MD   ibuprofen 200 mg tablet Take 4 tablets (800 mg) by mouth every 8 hours if needed for mild pain (1 - 3) or moderate pain (4 - 6).   Yes Historical Provider, MD   omega-3 (Super Omega-3) 1,000 mg capsule capsule Take 1 capsule (1,000 mg) by mouth once daily.   Yes Historical Provider, MD   omeprazole (PriLOSEC) 20 mg DR capsule Take 1 capsule (20 mg) by mouth once daily as needed (GERD). Do not crush or chew.  12/12/24 Yes Historical Provider, MD   acetaminophen (Tylenol) 500 mg tablet Take 2 tablets (1,000 mg) by mouth every 6 hours if needed for mild pain (1 - 3). 12/12/24   Aaliyah Fajardo MD  "  ascorbic acid (Vitamin C) 1,000 mg tablet Take 1 tablet (1,000 mg) by mouth once daily.    Historical Provider, MD   diphenhydrAMINE (Sominex) 25 mg tablet Take 1 tablet (25 mg) by mouth every 6 hours for 5 days. 10/24/24 10/29/24  Maya Phillip MD   tiZANidine (Zanaflex) 2 mg tablet Take 2 tablets (4 mg) by mouth every 6 hours if needed (pain). 12/12/24   Aaliyah Fajardo MD     Allergies   Allergen Reactions    Erythromycin Unknown    Penicillins Hives    Sulfa (Sulfonamide Antibiotics) Hives     Social History     Tobacco Use    Smoking status: Never    Smokeless tobacco: Never   Substance Use Topics    Alcohol use: Yes     Alcohol/week: 4.0 standard drinks of alcohol     Types: 4 Glasses of wine per week         Chemistry    Lab Results   Component Value Date/Time     10/24/2024 0637    K 4.0 10/24/2024 0637     10/24/2024 0637    CO2 25 10/24/2024 0637    BUN 23 10/24/2024 0637    CREATININE 0.80 10/24/2024 0637    Lab Results   Component Value Date/Time    CALCIUM 9.2 10/24/2024 0637    ALKPHOS 74 10/24/2024 0637    AST 17 10/24/2024 0637    ALT 17 10/24/2024 0637    BILITOT 0.4 10/24/2024 0637          Lab Results   Component Value Date/Time    WBC 10.3 10/24/2024 0637    HGB 17.3 (H) 10/24/2024 0637    HCT 50.5 (H) 10/24/2024 0637     10/24/2024 0637     No results found for: \"PROTIME\", \"PTT\", \"INR\"  No results found for this or any previous visit (from the past 4464 hours).    NPO Detail:  NPO/Void Status  Date of Last Liquid: 12/11/24  Time of Last Liquid: 2000  Date of Last Solid: 12/11/24  Time of Last Solid: 2000  Last Intake Type: Clear fluids  Time of Last Void: 1015         Physical Exam    Airway  Mallampati: II  TM distance: >3 FB  Neck ROM: full     Cardiovascular    Dental - normal exam     Pulmonary    Abdominal            Anesthesia Plan    History of general anesthesia?: yes  History of complications of general anesthesia?: no    ASA 2     general     The patient is not a " current smoker.  Patient was not previously instructed to abstain from smoking on day of procedure.  Patient did not smoke on day of procedure.  Education provided regarding risk of obstructive sleep apnea.  intravenous induction   Anesthetic plan and risks discussed with patient.    Plan discussed with CRNA and CAA.

## 2024-12-12 NOTE — ANESTHESIA PROCEDURE NOTES
Airway  Date/Time: 12/12/2024 1:38 PM  Urgency: elective    Airway not difficult    Staffing  Performed: [facility]   Authorized by: Koko Byrnes MD    Performed by: [facility] [place]  Patient location during procedure: OR    Indications and Patient Condition  Indications for airway management: anesthesia  Spontaneous Ventilation: absent  Sedation level: deep  Preoxygenated: yes  Patient position: sniffing  Mask difficulty assessment: 1 - vent by mask    Final Airway Details  Final airway type: endotracheal airway      Successful airway: ETT  Cuffed: yes   Successful intubation technique: video laryngoscopy  Endotracheal tube insertion site: oral  Blade: Rico  Blade size: #3  ETT size (mm): 7.0  Cormack-Lehane Classification: grade I - full view of glottis  Placement verified by: chest auscultation   Measured from: lips  ETT to lips (cm): 22  Number of attempts at approach: 1  Ventilation between attempts: BVM    Additional Comments  Easy by respiratory therapist with glidescope

## 2024-12-17 LAB
LABORATORY COMMENT REPORT: NORMAL
PATH REPORT.FINAL DX SPEC: NORMAL
PATH REPORT.GROSS SPEC: NORMAL
PATH REPORT.RELEVANT HX SPEC: NORMAL
PATH REPORT.TOTAL CANCER: NORMAL

## 2024-12-23 PROBLEM — R53.83 FATIGUE: Status: RESOLVED | Noted: 2023-09-09 | Resolved: 2024-12-23

## 2024-12-23 PROBLEM — R06.81 APNEA: Status: RESOLVED | Noted: 2023-09-09 | Resolved: 2024-12-23

## 2024-12-23 PROBLEM — N92.6 IRREGULAR MENSTRUAL CYCLE: Status: RESOLVED | Noted: 2023-09-09 | Resolved: 2024-12-23

## 2024-12-27 ENCOUNTER — OFFICE VISIT (OUTPATIENT)
Dept: SURGERY | Facility: CLINIC | Age: 59
End: 2024-12-27
Payer: COMMERCIAL

## 2024-12-27 VITALS
HEIGHT: 67 IN | DIASTOLIC BLOOD PRESSURE: 80 MMHG | WEIGHT: 212 LBS | BODY MASS INDEX: 33.27 KG/M2 | SYSTOLIC BLOOD PRESSURE: 126 MMHG | TEMPERATURE: 97.8 F

## 2024-12-27 DIAGNOSIS — Z09 POSTOPERATIVE EXAMINATION: Primary | ICD-10-CM

## 2024-12-27 PROCEDURE — 1036F TOBACCO NON-USER: CPT | Performed by: STUDENT IN AN ORGANIZED HEALTH CARE EDUCATION/TRAINING PROGRAM

## 2024-12-27 PROCEDURE — 3008F BODY MASS INDEX DOCD: CPT | Performed by: STUDENT IN AN ORGANIZED HEALTH CARE EDUCATION/TRAINING PROGRAM

## 2024-12-27 PROCEDURE — 99211 OFF/OP EST MAY X REQ PHY/QHP: CPT | Performed by: STUDENT IN AN ORGANIZED HEALTH CARE EDUCATION/TRAINING PROGRAM

## 2024-12-27 ASSESSMENT — ENCOUNTER SYMPTOMS
DIARRHEA: 0
ABDOMINAL PAIN: 0
VOMITING: 0
DEPRESSION: 0
LOSS OF SENSATION IN FEET: 0
OCCASIONAL FEELINGS OF UNSTEADINESS: 0
NAUSEA: 0

## 2024-12-27 ASSESSMENT — COLUMBIA-SUICIDE SEVERITY RATING SCALE - C-SSRS
2. HAVE YOU ACTUALLY HAD ANY THOUGHTS OF KILLING YOURSELF?: NO
6. HAVE YOU EVER DONE ANYTHING, STARTED TO DO ANYTHING, OR PREPARED TO DO ANYTHING TO END YOUR LIFE?: NO
1. IN THE PAST MONTH, HAVE YOU WISHED YOU WERE DEAD OR WISHED YOU COULD GO TO SLEEP AND NOT WAKE UP?: NO

## 2024-12-27 ASSESSMENT — PATIENT HEALTH QUESTIONNAIRE - PHQ9
1. LITTLE INTEREST OR PLEASURE IN DOING THINGS: NOT AT ALL
2. FEELING DOWN, DEPRESSED OR HOPELESS: NOT AT ALL
SUM OF ALL RESPONSES TO PHQ9 QUESTIONS 1 & 2: 0

## 2024-12-27 ASSESSMENT — PAIN SCALES - GENERAL: PAINLEVEL_OUTOF10: 0-NO PAIN

## 2024-12-27 NOTE — PROGRESS NOTES
Subjective   Patient ID: Mayuri La is a 59 y.o. female who presents for No chief complaint on file..  Doing well since surgery.  Had minimal abdominal pain which is now resolved.  No nausea or vomiting.  Has not had any diarrhea.        Review of Systems   Gastrointestinal:  Negative for abdominal pain, diarrhea, nausea and vomiting.       Objective   Physical Exam  Abdominal:      Palpations: Abdomen is soft.      Comments: Incisions well-healing, Dermabond is off       FINAL DIAGNOSIS   A. GALLBLADDER CHOLECYSTECTOMY:     Chronic cholecystitis with reactive changes.    Cholelithiasis.         Assessment/Plan   Problem List Items Addressed This Visit    None  Visit Diagnoses         Codes    Postoperative examination    -  Primary Z09          Recovering well after robotic assisted cholecystectomy for chronic cholecystitis.  We reviewed her pathology which was benign with gallstones.  Reviewed the lifting restrictions for another month.  All questions were answered.  She is up-to-date with Cologuard but encouraged her to call back if she desires a colonoscopy in the future.  She can otherwise follow-up with me as needed.       Aaliyah Fajardo MD 12/27/24

## 2025-04-08 ASSESSMENT — ENCOUNTER SYMPTOMS
FATIGUE: 0
ABDOMINAL PAIN: 0
DYSURIA: 0
ACTIVITY CHANGE: 0
CHEST TIGHTNESS: 0
SHORTNESS OF BREATH: 0
ABDOMINAL DISTENTION: 0
COLOR CHANGE: 0
DIFFICULTY URINATING: 0
UNEXPECTED WEIGHT CHANGE: 0
JOINT SWELLING: 0
WEAKNESS: 0
ADENOPATHY: 0
DIZZINESS: 0
HEADACHES: 0

## 2025-04-09 ENCOUNTER — OFFICE VISIT (OUTPATIENT)
Dept: OBSTETRICS AND GYNECOLOGY | Facility: CLINIC | Age: 60
End: 2025-04-09
Payer: COMMERCIAL

## 2025-04-09 VITALS
DIASTOLIC BLOOD PRESSURE: 78 MMHG | SYSTOLIC BLOOD PRESSURE: 122 MMHG | HEIGHT: 67 IN | BODY MASS INDEX: 33.09 KG/M2 | WEIGHT: 210.8 LBS

## 2025-04-09 DIAGNOSIS — Z78.0 MENOPAUSE: ICD-10-CM

## 2025-04-09 DIAGNOSIS — Z12.11 SCREEN FOR COLON CANCER: ICD-10-CM

## 2025-04-09 DIAGNOSIS — Z12.31 ENCOUNTER FOR SCREENING MAMMOGRAM FOR MALIGNANT NEOPLASM OF BREAST: ICD-10-CM

## 2025-04-09 DIAGNOSIS — Z11.51 SCREENING FOR HUMAN PAPILLOMAVIRUS: ICD-10-CM

## 2025-04-09 DIAGNOSIS — N95.2 POSTMENOPAUSAL ATROPHIC VAGINITIS: ICD-10-CM

## 2025-04-09 DIAGNOSIS — Z01.419 VISIT FOR GYNECOLOGIC EXAMINATION: Primary | ICD-10-CM

## 2025-04-09 DIAGNOSIS — M81.0 POSTMENOPAUSAL BONE LOSS: ICD-10-CM

## 2025-04-09 PROCEDURE — 99396 PREV VISIT EST AGE 40-64: CPT | Performed by: OBSTETRICS & GYNECOLOGY

## 2025-04-09 PROCEDURE — 3008F BODY MASS INDEX DOCD: CPT | Performed by: OBSTETRICS & GYNECOLOGY

## 2025-04-09 PROCEDURE — 1036F TOBACCO NON-USER: CPT | Performed by: OBSTETRICS & GYNECOLOGY

## 2025-04-09 ASSESSMENT — ENCOUNTER SYMPTOMS
DEPRESSION: 0
LOSS OF SENSATION IN FEET: 0
OCCASIONAL FEELINGS OF UNSTEADINESS: 0

## 2025-04-09 ASSESSMENT — LIFESTYLE VARIABLES
HOW OFTEN DO YOU HAVE SIX OR MORE DRINKS ON ONE OCCASION: NEVER
SKIP TO QUESTIONS 9-10: 1
HOW MANY STANDARD DRINKS CONTAINING ALCOHOL DO YOU HAVE ON A TYPICAL DAY: 1 OR 2
HOW OFTEN DO YOU HAVE A DRINK CONTAINING ALCOHOL: 2-3 TIMES A WEEK
AUDIT-C TOTAL SCORE: 3

## 2025-04-09 ASSESSMENT — PATIENT HEALTH QUESTIONNAIRE - PHQ9
SUM OF ALL RESPONSES TO PHQ9 QUESTIONS 1 & 2: 0
1. LITTLE INTEREST OR PLEASURE IN DOING THINGS: NOT AT ALL
2. FEELING DOWN, DEPRESSED OR HOPELESS: NOT AT ALL

## 2025-04-09 ASSESSMENT — PAIN SCALES - GENERAL: PAINLEVEL_OUTOF10: 0-NO PAIN

## 2025-04-09 NOTE — PROGRESS NOTES
"Annual-menopause  Subjective   Mayuri La is a 59 y.o. female, last seen 3/21/2024, who is here for a routine exam.   Complaints:   denies vag bleed or discharge; denies pelvic  pain, pressure, or persistent bloating.  Denies vaso or sleep or vag sxs. Denies lkg urine; has typically voided once nightly, pre dates menopause.  Interval history positive for robotic cholecystectomy; Dr. Fajardo  Mamm 10/02/2024 neg; waxing/waning cysts  Cologuard 2023 neg  Review of Systems   Constitutional:  Negative for activity change, fatigue and unexpected weight change.   Respiratory:  Negative for chest tightness and shortness of breath.    Cardiovascular:  Negative for chest pain and leg swelling.   Gastrointestinal:  Negative for abdominal distention and abdominal pain.   Genitourinary:  Negative for difficulty urinating, dysuria, genital sores, pelvic pain, vaginal bleeding, vaginal discharge and vaginal pain.   Musculoskeletal:  Negative for gait problem and joint swelling.   Skin:  Negative for color change and rash.   Neurological:  Negative for dizziness, weakness and headaches.   Hematological:  Negative for adenopathy.   Objective Visit Vitals  /78   Ht 1.702 m (5' 7\")   Wt 95.6 kg (210 lb 12.8 oz)   BMI 33.02 kg/m²   OB Status Postmenopausal   Smoking Status Never   BSA 2.13 m²       General:   Alert and oriented, in no acute distress   Neck: Supple. No visible thyromegaly.    Breast/Axilla: Normal to palpation bilaterally without masses, skin changes, or nipple discharge.    Abdomen: Soft, non-tender, without masses or organomegaly; scope incisions well-healed   Vulva: Normal architecture without erythema, masses, or lesions.    Vagina:  mucosa without lesions, masses.  Positive atrophy. No blood or abnormal vaginal discharge.    Cervix: Normal without masses, lesions, or signs of cervicitis.  Pap sent   Uterus: Grossly normal mobile, non-enlarged uterus    Adnexa: No palpable masses or tenderness; exam " limited by BMI   Pelvic Floor No POP noted. No high tone pelvic floor    Psych   Normal affect. Normal mood.      Assessment/Plan   Encounter Diagnoses   Name Primary?    Visit for gynecologic examination; no suspicious findings on current breast or pelvic exam Yes    Screening for human papillomavirus; Pap sent     Encounter for screening mammogram for malignant neoplasm of breast; completed and normal October 2024; Future order placed.     Menopause; no significant symptoms per patient     Postmenopausal bone loss; prevention strategies discussed     Postmenopausal atrophic vaginitis; no appreciable symptoms as yet     Screen for colon cancer; next Cologuard due 2026     BMI 33.0-33.9,adult; reviewed recs for healthy diet and exercise.       Karen Grande MD

## 2025-05-10 ENCOUNTER — ANCILLARY PROCEDURE (OUTPATIENT)
Dept: URGENT CARE | Age: 60
End: 2025-05-10
Payer: COMMERCIAL

## 2025-05-10 ENCOUNTER — OFFICE VISIT (OUTPATIENT)
Dept: URGENT CARE | Age: 60
End: 2025-05-10
Payer: COMMERCIAL

## 2025-05-10 VITALS
BODY MASS INDEX: 32.49 KG/M2 | WEIGHT: 207 LBS | HEIGHT: 67 IN | SYSTOLIC BLOOD PRESSURE: 138 MMHG | DIASTOLIC BLOOD PRESSURE: 85 MMHG | HEART RATE: 89 BPM | OXYGEN SATURATION: 98 % | RESPIRATION RATE: 16 BRPM

## 2025-05-10 DIAGNOSIS — R52 PAIN: ICD-10-CM

## 2025-05-10 DIAGNOSIS — M25.572 ACUTE LEFT ANKLE PAIN: ICD-10-CM

## 2025-05-10 PROCEDURE — 73610 X-RAY EXAM OF ANKLE: CPT | Mod: LEFT SIDE | Performed by: NURSE PRACTITIONER

## 2025-05-10 ASSESSMENT — PAIN SCALES - GENERAL: PAINLEVEL_OUTOF10: 7

## 2025-05-10 NOTE — PROGRESS NOTES
"Subjective   Patient ID: Mayuri La is a 59 y.o. female. They present today with a chief complaint of Injury (Left ankle injury, fell down ).    History of Present Illness    Injury  Patient states she was at a wedding and twisted her ankle.  C/o swelling and pain to left ankle.  She did take Ibuprofen when it happened and did ice it.      Past Medical History  Allergies as of 05/10/2025 - Reviewed 05/10/2025   Allergen Reaction Noted    Erythromycin Unknown 09/09/2023    Penicillins Hives 09/09/2023    Sulfa (sulfonamide antibiotics) Hives 09/09/2023       Prescriptions Prior to Admission[1]     Medical History[2]    Surgical History[3]     reports that she has never smoked. She has never used smokeless tobacco. She reports current alcohol use of about 4.0 standard drinks of alcohol per week. She reports that she does not use drugs.    Review of Systems  Review of Systems                               Objective    Vitals:    05/10/25 1440   BP: 138/85   Pulse: 89   Resp: 16   SpO2: 98%   Weight: 93.9 kg (207 lb)   Height: 1.702 m (5' 7\")     No LMP recorded. Patient is postmenopausal.    Physical Exam  Musculoskeletal:      Left ankle: Swelling present. No lacerations. Tenderness present over the lateral malleolus. Normal pulse.     Constitutional:       Appearance: Normal appearance.   HENT:      Head: Normocephalic and atraumatic.   Cardiovascular:      Rate and Rhythm: Normal rate and regular rhythm.      Pulses: Normal pulses.      Heart sounds: Normal heart sounds.   Pulmonary:      Effort: Pulmonary effort is normal.      Breath sounds: Normal breath sounds.     Procedures    Point of Care Test & Imaging Results from this visit  No results found for this visit on 05/10/25.   Imaging  No results found.    Cardiology, Vascular, and Other Imaging  No other imaging results found for the past 2 days      Diagnostic study results (if any) were reviewed by EUGENE Allen-CNP.    Assessment/Plan "   Allergies, medications, history, and pertinent labs/EKGs/Imaging reviewed by BEN Allen.     Medical Decision Making  No acute process noted.  Will be reviewed by radiology and will call patient if they disagree with findings.    At time of discharge patient was clinically well-appearing and HDS for outpatient management. The patient and/or family was educated regarding diagnosis, supportive care, OTC and Rx medications. The patient and/or family was given the opportunity to ask questions prior to discharge.  They verbalized understanding of my discussion of the plans for treatment, expected course, indications to return to  or seek further evaluation in ED, and the need for timely follow up as directed.   They were provided with a work/school excuse if requested.    Orders and Diagnoses  Diagnoses and all orders for this visit:  Acute left ankle pain  -     XR ankle left 3+ views; Future  -     Adult Referral to Orthopedics and Sports Medicine; Future      Medical Admin Record      Patient disposition: Home    Electronically signed by BEN Allen  3:01 PM           [1] (Not in a hospital admission)   [2]   Past Medical History:  Diagnosis Date    Allergic 1998    Apnea 09/09/2023    Fatigue 09/09/2023    Irregular menstrual cycle 09/09/2023    Varicella 1966   [3]   Past Surgical History:  Procedure Laterality Date    CHOLECYSTECTOMY  12/12/2024    OTHER SURGICAL HISTORY  01/26/2022    [place] tooth extraction

## 2025-06-23 ENCOUNTER — APPOINTMENT (OUTPATIENT)
Dept: PRIMARY CARE | Facility: CLINIC | Age: 60
End: 2025-06-23
Payer: COMMERCIAL

## 2025-07-09 NOTE — PROGRESS NOTES
Subjective      Chief Complaint   Patient presents with    Left Ankle - Pain        HPI  This 59 year old female presents to the office today with left ankle pain. This left ankle pain has been present since May.  On 5/10 she presented to Urgent Care due to twisting her left ankle at a wedding. she noticed immediate swelling and pain, she iced and took Ibuprofen at that time. X-rays were completed that showed no fracture and was given a referral to Orthopedics.  She wore a walking boot for 2 weeks which did relieve her pain.  However she has discontinued use of the left lower extremity walking boot.  She presents today and states the left ankle pain is persistent.  She noticed increased swelling at the left ankle.  She states left ankle pain has her ability to do her normal activities of daily living.  She presents today for discussion of further treatment options.    CARDIOLOGY:   Negative for chest pain, shortness of breath.   RESPIRATORY:   Negative for chest pain, shortness of breath.   MUSCULOSKELETAL:   See HPI for details.   NEUROLOGY:   Negative for tingling, numbness, weakness.    Objective    There were no vitals filed for this visit.    Physical Exam  GENERAL:          General Appearance:  This is a pleasant patient with appropriate affect, in no acute distress.   DERMATOLOGY:          Skin: skin at the neck, upper and lower back, and trunk is intact. There is no evidence of skin rash, skin breakdown or ulceration, or atrophic skin change.   EXTREMITIES:          Vascular:  Right, left hands and feet are warm with good color and pulses. Right and left calf and thigh are nontender and nonswollen.   NEUROLOGICAL:          Orientation:  Patient is alert and oriented to person, place, time and situation. Right and left upper and lower extremity motor and sensory examinations are intact.      MUSCULOSKELETAL: Neck: Nontender. No pain with range of motion.  Right ankle: Nontender no pain or limitation in range  of motion.  Left ankle: There is diffuse tenderness and swelling at the lateral malleolus.  There is diffuse tenderness to palpation at the lateral malleolus.  There is pain with gentle flexion extension.  There is pain with inversion eversion of the left ankle.  Brown's is negative and equal bilaterally.  Nontender at the left calf.  Brisk capillary refill at the left foot distal pulses are readily palpable.  The patient is seen today walking with an unstable gait favoring the left lower extremity while walking.    Imaging  AP and lateral x-rays of the left ankle done on 5/10/2025 show:  FINDINGS:  No fracture or dislocation is evident.  The ankle mortise is  congruent and the tibial plafond and talar dome are intact.  There is  a plantar calcaneal enthesophyte. Mild degenerative changes seen in  the midfoot. No ankle joint effusion is evident. No soft tissue gas  or radiopaque foreign body is evident.      IMPRESSION:  No osseous injury is evident.    AP and lateral x-rays of the left ankle done in the office today show no definite evidence of acute fracture.  There are mild degenerative changes in the small loose body lateral to the distal fibula.    Cardiology, Vascular, and Other Imaging  No other imaging results found for the past 7 days       Mayuri was seen today for pain.  Diagnoses and all orders for this visit:  Acute left ankle pain (Primary)  -     XR ankle left 3+ views; Future  -     Adult Referral to Orthopedics and Sports Medicine  -     CT ankle left wo IV contrast; Future  Moderate left ankle sprain, initial encounter  -     XR ankle left 3+ views; Future  -     CT ankle left wo IV contrast; Future  Contusion of left ankle, initial encounter  -     CT ankle left wo IV contrast; Future     Options are discussed with the patient in detail. The patient is given a referral for CT scan of the left ankle to further evaluate her left ankle pain and difficulty ambulating.  The patient is instructed  regarding activity modification and risk for further injury with falling or trauma, ice, provider directed at home gentle strengthening and ROM exercises, and the appropriate use of Tylenol as needed for pain with its potential adverse reactions and side effects. The patient understands. Follow up after CT is complete or sooner as needed. Please note that this report has been produced using speech recognition software.  It may contain errors related to grammar, punctuation or spelling.  Electronically signed, but not reviewed.   Kelli Pollack PA-C

## 2025-07-11 ENCOUNTER — OFFICE VISIT (OUTPATIENT)
Dept: ORTHOPEDIC SURGERY | Facility: CLINIC | Age: 60
End: 2025-07-11
Payer: COMMERCIAL

## 2025-07-11 ENCOUNTER — HOSPITAL ENCOUNTER (OUTPATIENT)
Dept: RADIOLOGY | Facility: CLINIC | Age: 60
Discharge: HOME | End: 2025-07-11
Payer: COMMERCIAL

## 2025-07-11 VITALS — WEIGHT: 207 LBS | HEIGHT: 67 IN | BODY MASS INDEX: 32.49 KG/M2

## 2025-07-11 DIAGNOSIS — S90.02XA CONTUSION OF LEFT ANKLE, INITIAL ENCOUNTER: ICD-10-CM

## 2025-07-11 DIAGNOSIS — S93.402A MODERATE LEFT ANKLE SPRAIN, INITIAL ENCOUNTER: ICD-10-CM

## 2025-07-11 DIAGNOSIS — M25.572 ACUTE LEFT ANKLE PAIN: ICD-10-CM

## 2025-07-11 DIAGNOSIS — M25.572 ACUTE LEFT ANKLE PAIN: Primary | ICD-10-CM

## 2025-07-11 PROCEDURE — 1036F TOBACCO NON-USER: CPT | Performed by: PHYSICIAN ASSISTANT

## 2025-07-11 PROCEDURE — 3008F BODY MASS INDEX DOCD: CPT | Performed by: PHYSICIAN ASSISTANT

## 2025-07-11 PROCEDURE — 99214 OFFICE O/P EST MOD 30 MIN: CPT | Performed by: PHYSICIAN ASSISTANT

## 2025-07-11 PROCEDURE — 99203 OFFICE O/P NEW LOW 30 MIN: CPT | Performed by: PHYSICIAN ASSISTANT

## 2025-07-11 PROCEDURE — 73610 X-RAY EXAM OF ANKLE: CPT | Mod: LT

## 2025-07-11 ASSESSMENT — PAIN SCALES - GENERAL
PAINLEVEL_OUTOF10: 6
PAINLEVEL_OUTOF10: 6

## 2025-07-11 ASSESSMENT — PATIENT HEALTH QUESTIONNAIRE - PHQ9
SUM OF ALL RESPONSES TO PHQ9 QUESTIONS 1 AND 2: 0
2. FEELING DOWN, DEPRESSED OR HOPELESS: NOT AT ALL
1. LITTLE INTEREST OR PLEASURE IN DOING THINGS: NOT AT ALL

## 2025-07-11 ASSESSMENT — ENCOUNTER SYMPTOMS
DEPRESSION: 0
LOSS OF SENSATION IN FEET: 0
OCCASIONAL FEELINGS OF UNSTEADINESS: 0

## 2025-07-11 ASSESSMENT — LIFESTYLE VARIABLES
HOW OFTEN DO YOU HAVE A DRINK CONTAINING ALCOHOL: 2-4 TIMES A MONTH
HOW OFTEN DO YOU HAVE SIX OR MORE DRINKS ON ONE OCCASION: MONTHLY

## 2025-07-11 ASSESSMENT — PAIN - FUNCTIONAL ASSESSMENT: PAIN_FUNCTIONAL_ASSESSMENT: 0-10

## 2025-07-18 NOTE — PROGRESS NOTES
Subjective      Chief Complaint   Patient presents with    Left Ankle - Pain, Follow-up        HPI  This 59 year old female presents to the office today with left ankle pain. This left ankle pain has been present since May.  On 5/10 she presented to Urgent Care due to twisting her left ankle at a wedding. she noticed immediate swelling and pain, she iced and took Ibuprofen at that time. X-rays were completed that showed no fracture and was given a referral to Orthopedics.  She wore a walking boot for 2 weeks which did relieve her pain.  However she has discontinued use of the left lower extremity walking boot.  I previously evaluated her for left ankle sprain with CT as she had persistent difficulty walking. She presents today wearing a left ankle brace for support. She presents today and states the left ankle pain has improved. She is slowly resuming her normal activities of daily living.  She presents today for discussion of further treatment options.    CARDIOLOGY:   Negative for chest pain, shortness of breath.   RESPIRATORY:   Negative for chest pain, shortness of breath.   MUSCULOSKELETAL:   See HPI for details.   NEUROLOGY:   Negative for tingling, numbness, weakness.    Objective    There were no vitals filed for this visit.    Physical Exam  GENERAL:          General Appearance:  This is a pleasant patient with appropriate affect, in no acute distress.   DERMATOLOGY:          Skin: skin at the neck, upper and lower back, and trunk is intact. There is no evidence of skin rash, skin breakdown or ulceration, or atrophic skin change.   EXTREMITIES:          Vascular:  Right, left hands and feet are warm with good color and pulses. Right and left calf and thigh are nontender and nonswollen.   NEUROLOGICAL:          Orientation:  Patient is alert and oriented to person, place, time and situation. Right and left upper and lower extremity motor and sensory examinations are intact.      MUSCULOSKELETAL: Neck:  Nontender. No pain with range of motion.  Right ankle: Nontender no pain or limitation in range of motion.  Left ankle: There is tenderness and swelling at the lateral malleolus.  There is no tenderness to palpation at the lateral malleolus.  There is pain with gentle flexion extension.  There is no pain with inversion eversion of the left ankle.  Brown's is negative and equal bilaterally.  Nontender at the left calf.  Brisk capillary refill at the left foot distal pulses are readily palpable.  The patient is seen today walking with an unstable gait favoring the left lower extremity while walking.    Imaging  AP and lateral x-rays of the left ankle done on 5/10/2025 show:  FINDINGS:  No fracture or dislocation is evident.  The ankle mortise is  congruent and the tibial plafond and talar dome are intact.  There is  a plantar calcaneal enthesophyte. Mild degenerative changes seen in  the midfoot. No ankle joint effusion is evident. No soft tissue gas  or radiopaque foreign body is evident.      IMPRESSION:  No osseous injury is evident.    AP and lateral x-rays of the left ankle done in the office today show no definite evidence of acute fracture.  There are mild degenerative changes in the small loose body lateral to the distal fibula.    XR ankle left 3+ views  Result Date: 7/11/2025  Interpreted By:  Gamaliel Gandhi, STUDY: XR ANKLE LEFT 3+ VIEWS; ;  7/11/2025 8:20 am   INDICATION: Signs/Symptoms:LEFT ANKLE PAIN.   COMPARISON: None.   ACCESSION NUMBER(S): [id number]   ORDERING CLINICIAN: ISIAH MUÑIZ   FINDINGS: X-rays of the left ankle done and read in the office show a large plantar calcaneal heel spur and also osteoarthritis of the left ankle.  No definite acute fracture is identified.  Mortise is intact.       See findings above.     MACRO: None   Signed by: Gamaliel Gandhi 7/11/2025 8:39 AM Dictation workstation:   [id number]    CT ankle left wo IV contrast  Result Date: 7/22/2025  Interpreted By:   Mindi Kaye, STUDY: CT ANKLE LEFT WO IV CONTRAST; ;  7/22/2025 12:57 pm   INDICATION: Signs/Symptoms:left ankle pain, swelling.   ,M25.572 Pain in left ankle and joints of left foot,S93.402A Sprain of unspecified ligament of left ankle, initial encounter,S90.02XA Contusion of left ankle, initial encounter   COMPARISON: 07/11/2025   ACCESSION NUMBER(S): [id number]   ORDERING CLINICIAN: ISIAH MUÑIZ   TECHNIQUE: Serial axial CT images obtained of the left ankle. Images reformatted in the coronal and sagittal projection.   FINDINGS: Medial malleolus demonstrates no evidence for fracture. No cortical or trabecular discontinuity demonstrated. Lateral malleolus is unremarkable. No evidence for fracture. Mild subcutaneous edema and soft tissue swelling in particular about the lateral malleolus.   Ankle joint demonstrates no effusion. Hindfoot osseous structures demonstrate large calcaneal spur plantar fascial origin. No cortical or trabecular discontinuity within the hindfoot. There is a remote cortical avulsion fragments demonstrated about the anterior process of the calcaneus with fragment identified measuring 11 mm in the transverse dimension which is well corticated. A separate remote cortical avulsion fragment demonstrated about the anterior process of the calcaneus laterally about the calcaneocuboid articulation with this fragment measuring 7 mm. Previously described findings are about the attachment of the bifurcate ligament as well as in the region of the lateral calcaneocuboid ligament.   Midfoot demonstrates intertarsal osteoarthritis with dorsal osteophytosis about the naviculocuneiform articulations with mild subcortical cystic change. Lisfranc joint is congruent. There is a mild 1st and moderate 2nd and 3rd tarsometatarsal joint osteoarthritis.   Achilles tendon is unremarkable. Chronic thickened plantar fascia demonstrated. Extensor tendons are unremarkable. Flexor and peroneal tendons are  unremarkable. Achilles tendon is intact.           1. Mild generalized subcutaneous edema about the ankle in particular about the lateral malleolus without evidence for discrete fracture.   2. Remote cortical avulsion fragments from the anterior process of the calcaneus about the attachment of the bifurcate ligament as well as along the lateral margin of the anterior process.     MACRO: None   Signed by: Mindi Kaye 7/22/2025 1:25 PM Dictation workstation:   [id number]    XR ankle left 3+ views  Result Date: 7/11/2025  Interpreted By:  Gamaliel Gandhi, STUDY: XR ANKLE LEFT 3+ VIEWS; ;  7/11/2025 8:20 am   INDICATION: Signs/Symptoms:LEFT ANKLE PAIN.   COMPARISON: None.   ACCESSION NUMBER(S): [id number]   ORDERING CLINICIAN: ISIAH MUÑIZ   FINDINGS: X-rays of the left ankle done and read in the office show a large plantar calcaneal heel spur and also osteoarthritis of the left ankle.  No definite acute fracture is identified.  Mortise is intact.       See findings above.     MACRO: None   Signed by: Gamaliel Gandhi 7/11/2025 8:39 AM Dictation workstation:   [id number]       Cardiology, Vascular, and Other Imaging  No other imaging results found for the past 7 days       Mayuri was seen today for pain and follow-up.  Diagnoses and all orders for this visit:  Acute left ankle pain (Primary)  Moderate left ankle sprain, initial encounter  Contusion of left ankle, initial encounter  Avulsion fracture of calcaneus with routine healing, left     Options are discussed with the patient in detail. The patient is instructed to continue with the left ankle brace and to do activities as her pain allows.  The patient is instructed regarding activity modification and risk for further injury with falling or trauma, ice, provider directed at home gentle strengthening and ROM exercises, and the appropriate use of Tylenol as needed for pain with its potential adverse reactions and side effects. The patient understands. Follow  up as needed. Please note that this report has been produced using speech recognition software.  It may contain errors related to grammar, punctuation or spelling.  Electronically signed, but not reviewed.     Kelli Pollack PA-C

## 2025-07-22 ENCOUNTER — HOSPITAL ENCOUNTER (OUTPATIENT)
Dept: RADIOLOGY | Facility: HOSPITAL | Age: 60
Discharge: HOME | End: 2025-07-22
Payer: COMMERCIAL

## 2025-07-22 DIAGNOSIS — S93.402A MODERATE LEFT ANKLE SPRAIN, INITIAL ENCOUNTER: ICD-10-CM

## 2025-07-22 DIAGNOSIS — S90.02XA CONTUSION OF LEFT ANKLE, INITIAL ENCOUNTER: ICD-10-CM

## 2025-07-22 DIAGNOSIS — M25.572 ACUTE LEFT ANKLE PAIN: ICD-10-CM

## 2025-07-22 PROCEDURE — 73700 CT LOWER EXTREMITY W/O DYE: CPT | Mod: LEFT SIDE | Performed by: RADIOLOGY

## 2025-07-22 PROCEDURE — 73700 CT LOWER EXTREMITY W/O DYE: CPT | Mod: LT

## 2025-07-24 ENCOUNTER — OFFICE VISIT (OUTPATIENT)
Dept: ORTHOPEDIC SURGERY | Facility: CLINIC | Age: 60
End: 2025-07-24
Payer: COMMERCIAL

## 2025-07-24 VITALS — WEIGHT: 207 LBS | HEIGHT: 67 IN | BODY MASS INDEX: 32.49 KG/M2

## 2025-07-24 DIAGNOSIS — S92.002D: ICD-10-CM

## 2025-07-24 DIAGNOSIS — M25.572 ACUTE LEFT ANKLE PAIN: Primary | ICD-10-CM

## 2025-07-24 DIAGNOSIS — S90.02XA CONTUSION OF LEFT ANKLE, INITIAL ENCOUNTER: ICD-10-CM

## 2025-07-24 DIAGNOSIS — S93.402A MODERATE LEFT ANKLE SPRAIN, INITIAL ENCOUNTER: ICD-10-CM

## 2025-07-24 PROCEDURE — 3008F BODY MASS INDEX DOCD: CPT | Performed by: PHYSICIAN ASSISTANT

## 2025-07-24 PROCEDURE — 99213 OFFICE O/P EST LOW 20 MIN: CPT | Performed by: PHYSICIAN ASSISTANT

## 2025-07-24 PROCEDURE — 1036F TOBACCO NON-USER: CPT | Performed by: PHYSICIAN ASSISTANT

## 2025-07-24 ASSESSMENT — COLUMBIA-SUICIDE SEVERITY RATING SCALE - C-SSRS
6. HAVE YOU EVER DONE ANYTHING, STARTED TO DO ANYTHING, OR PREPARED TO DO ANYTHING TO END YOUR LIFE?: NO
2. HAVE YOU ACTUALLY HAD ANY THOUGHTS OF KILLING YOURSELF?: NO
1. IN THE PAST MONTH, HAVE YOU WISHED YOU WERE DEAD OR WISHED YOU COULD GO TO SLEEP AND NOT WAKE UP?: NO

## 2025-07-24 ASSESSMENT — PAIN - FUNCTIONAL ASSESSMENT: PAIN_FUNCTIONAL_ASSESSMENT: 0-10

## 2025-07-24 ASSESSMENT — PAIN DESCRIPTION - DESCRIPTORS: DESCRIPTORS: ACHING

## 2025-07-24 ASSESSMENT — PAIN SCALES - GENERAL
PAINLEVEL_OUTOF10: 2
PAINLEVEL_OUTOF10: 2

## 2025-07-24 ASSESSMENT — ENCOUNTER SYMPTOMS
OCCASIONAL FEELINGS OF UNSTEADINESS: 0
DEPRESSION: 0
LOSS OF SENSATION IN FEET: 0

## 2025-09-16 ENCOUNTER — APPOINTMENT (OUTPATIENT)
Dept: PRIMARY CARE | Facility: CLINIC | Age: 60
End: 2025-09-16
Payer: COMMERCIAL

## (undated) DEVICE — ADHESIVE, SKIN, DERMABOND ADVANCED, 15CM, PEN-STYLE

## (undated) DEVICE — APPLIER, CLIP MED-LG XI

## (undated) DEVICE — SEAL, UNIVERSAL, 5-12MM

## (undated) DEVICE — SOLUTION, IRRIGATION, X RX SODIUM CHL 0.9%, 1000ML BTL

## (undated) DEVICE — GLOVE, SURGICAL, PROTEXIS PI BLUE W/NEUTHERA, 7.5, PF, LF

## (undated) DEVICE — Device

## (undated) DEVICE — ACCESS SYS, KII OPTICAL, Z-THREAD, 11X100CM

## (undated) DEVICE — TUBING, FLEXCIL,CONTRAST INJECTION, HIGH PRESSURE, 48 IN

## (undated) DEVICE — TUBING, INSUFFLATION, W/ .3 MICRO FILTER & ADAPTER

## (undated) DEVICE — SEAL, UNIVERSAL 5-8MM  XI

## (undated) DEVICE — POSITIONING, THE PINK PAD, PIGAZZI SYSTEM

## (undated) DEVICE — CLIP, LIGATING, HEM-O-LOCK, MEDIUM/LARGE, LF, GREEN

## (undated) DEVICE — SLEEVE, VASO PRESS, CALF GARMENT, MEDIUM, GREEN

## (undated) DEVICE — NEEDLE, INSUFFLATION, 13GAX120MM, DISP

## (undated) DEVICE — DRAPE, ARM XI

## (undated) DEVICE — RETRIEVAL SYSTEM, MONARCH, 10MM DISP ENDOSCOPIC

## (undated) DEVICE — CARE KIT, LAPAROSCOPIC, ADVANCED

## (undated) DEVICE — POSITIONING SYSTEM, PAGAZZI, PATIENT

## (undated) DEVICE — SUTURE, MONOCRYL, 4-0, 27 IN, PS-2, UNDYED

## (undated) DEVICE — GLOVE, SURGICAL, PROTEXIS PI , 7.0, PF, LF

## (undated) DEVICE — COVER, TIP HOT SHEARS ENDOWRIST

## (undated) DEVICE — OBTURATOR, BLADELESS , SU